# Patient Record
Sex: MALE | Race: WHITE | Employment: OTHER | URBAN - METROPOLITAN AREA
[De-identification: names, ages, dates, MRNs, and addresses within clinical notes are randomized per-mention and may not be internally consistent; named-entity substitution may affect disease eponyms.]

---

## 2019-10-29 ENCOUNTER — APPOINTMENT (OUTPATIENT)
Dept: VASCULAR SURGERY | Age: 84
DRG: 189 | End: 2019-10-29
Attending: INTERNAL MEDICINE
Payer: MEDICARE

## 2019-10-29 ENCOUNTER — HOSPITAL ENCOUNTER (INPATIENT)
Age: 84
LOS: 4 days | Discharge: NURSING, REHAB OR LONG-TERM CARE | DRG: 189 | End: 2019-11-02
Attending: EMERGENCY MEDICINE | Admitting: INTERNAL MEDICINE
Payer: MEDICARE

## 2019-10-29 ENCOUNTER — APPOINTMENT (OUTPATIENT)
Dept: GENERAL RADIOLOGY | Age: 84
DRG: 189 | End: 2019-10-29
Attending: EMERGENCY MEDICINE
Payer: MEDICARE

## 2019-10-29 ENCOUNTER — APPOINTMENT (OUTPATIENT)
Dept: NON INVASIVE DIAGNOSTICS | Age: 84
DRG: 189 | End: 2019-10-29
Attending: INTERNAL MEDICINE
Payer: MEDICARE

## 2019-10-29 DIAGNOSIS — J96.01 ACUTE RESPIRATORY FAILURE WITH HYPOXIA (HCC): Primary | ICD-10-CM

## 2019-10-29 PROBLEM — J96.02 ACUTE RESPIRATORY FAILURE WITH HYPOXIA AND HYPERCAPNIA (HCC): Status: ACTIVE | Noted: 2019-10-29

## 2019-10-29 PROBLEM — N18.9 CHRONIC KIDNEY DISEASE: Status: ACTIVE | Noted: 2019-10-29

## 2019-10-29 PROBLEM — I48.91 ATRIAL FIBRILLATION (HCC): Status: ACTIVE | Noted: 2019-10-29

## 2019-10-29 PROBLEM — F32.9 MAJOR DEPRESSIVE DISORDER: Status: ACTIVE | Noted: 2019-10-29

## 2019-10-29 PROBLEM — I10 HYPERTENSION: Status: ACTIVE | Noted: 2019-10-29

## 2019-10-29 PROBLEM — Z95.0 PACEMAKER: Status: ACTIVE | Noted: 2019-10-29

## 2019-10-29 PROBLEM — I63.9 CEREBRAL INFARCTION (HCC): Status: ACTIVE | Noted: 2019-10-29

## 2019-10-29 PROBLEM — G81.90 HEMIPLEGIA (HCC): Status: ACTIVE | Noted: 2019-10-29

## 2019-10-29 PROBLEM — I25.10 CAD (CORONARY ARTERY DISEASE): Status: ACTIVE | Noted: 2019-10-29

## 2019-10-29 LAB
ALBUMIN SERPL-MCNC: 3.6 G/DL (ref 3.5–5)
ALBUMIN/GLOB SERPL: 0.9 {RATIO} (ref 1.1–2.2)
ALP SERPL-CCNC: 91 U/L (ref 45–117)
ALT SERPL-CCNC: 15 U/L (ref 12–78)
ANION GAP SERPL CALC-SCNC: 6 MMOL/L (ref 5–15)
APPEARANCE UR: CLEAR
ARTERIAL PATENCY WRIST A: YES
ARTERIAL PATENCY WRIST A: YES
AST SERPL-CCNC: 20 U/L (ref 15–37)
ATRIAL RATE: 63 BPM
AV PEAK GRADIENT: 32.67 MMHG
AV VELOCITY RATIO: 0.43
B PERT DNA SPEC QL NAA+PROBE: NOT DETECTED
BACTERIA URNS QL MICRO: NEGATIVE /HPF
BASE DEFICIT BLDA-SCNC: 4.3 MMOL/L
BASE EXCESS BLDA CALC-SCNC: 1 MMOL/L
BASOPHILS # BLD: 0.1 K/UL (ref 0–0.1)
BASOPHILS NFR BLD: 1 % (ref 0–1)
BDY SITE: ABNORMAL
BDY SITE: ABNORMAL
BILIRUB SERPL-MCNC: 0.4 MG/DL (ref 0.2–1)
BILIRUB UR QL: NEGATIVE
BNP SERPL-MCNC: 3677 PG/ML
BUN SERPL-MCNC: 39 MG/DL (ref 6–20)
BUN/CREAT SERPL: 22 (ref 12–20)
C PNEUM DNA SPEC QL NAA+PROBE: NOT DETECTED
CALCIUM SERPL-MCNC: 9.1 MG/DL (ref 8.5–10.1)
CALCULATED R AXIS, ECG10: -27 DEGREES
CALCULATED T AXIS, ECG11: 32 DEGREES
CHLORIDE SERPL-SCNC: 110 MMOL/L (ref 97–108)
CO2 SERPL-SCNC: 26 MMOL/L (ref 21–32)
COLOR UR: ABNORMAL
COMMENT, HOLDF: NORMAL
CREAT SERPL-MCNC: 1.75 MG/DL (ref 0.7–1.3)
D DIMER PPP FEU-MCNC: 1.61 MG/L FEU (ref 0–0.65)
DIAGNOSIS, 93000: NORMAL
DIFFERENTIAL METHOD BLD: ABNORMAL
ECHO AO ROOT DIAM: 3.41 CM
ECHO AV AREA PEAK VELOCITY: 1.2 CM2
ECHO AV PEAK GRADIENT: 12.7 MMHG
ECHO AV PEAK VELOCITY: 177.88 CM/S
ECHO AV REGURGITANT PHT: 524.3 CM
ECHO EST RA PRESSURE: 8 MMHG
ECHO LA MAJOR AXIS: 5.42 CM
ECHO LA TO AORTIC ROOT RATIO: 1.59
ECHO LA VOL 2C: 42.5 ML (ref 18–58)
ECHO LA VOL 4C: 47.02 ML (ref 18–58)
ECHO LA VOL BP: 57.27 ML (ref 18–58)
ECHO LA VOL/BSA BIPLANE: 29.66 ML/M2 (ref 16–28)
ECHO LA VOLUME INDEX A2C: 22.01 ML/M2 (ref 16–28)
ECHO LA VOLUME INDEX A4C: 24.35 ML/M2 (ref 16–28)
ECHO LV INTERNAL DIMENSION DIASTOLIC: 5.13 CM (ref 4.2–5.9)
ECHO LV INTERNAL DIMENSION SYSTOLIC: 3.71 CM
ECHO LV IVSD: 1.05 CM (ref 0.6–1)
ECHO LV MASS 2D: 238.2 G (ref 88–224)
ECHO LV MASS INDEX 2D: 123.3 G/M2 (ref 49–115)
ECHO LV POSTERIOR WALL DIASTOLIC: 1.05 CM (ref 0.6–1)
ECHO LVOT DIAM: 1.93 CM
ECHO LVOT PEAK GRADIENT: 2.3 MMHG
ECHO LVOT PEAK VELOCITY: 75.62 CM/S
ECHO MV A VELOCITY: 56.56 CM/S
ECHO MV E DECELERATION TIME (DT): 313.6 MS
ECHO MV E VELOCITY: 58.74 CM/S
ECHO MV E/A RATIO: 1.04
ECHO MV REGURGITANT PEAK GRADIENT: 49.8 MMHG
ECHO MV REGURGITANT PEAK VELOCITY: 353.02 CM/S
ECHO PULMONARY ARTERY SYSTOLIC PRESSURE (PASP): 35.4 MMHG
ECHO PV MAX VELOCITY: 66.01 CM/S
ECHO PV PEAK GRADIENT: 1.7 MMHG
ECHO RIGHT VENTRICULAR SYSTOLIC PRESSURE (RVSP): 35.4 MMHG
ECHO RV INTERNAL DIMENSION: 3.56 CM
ECHO TV REGURGITANT MAX VELOCITY: 261.95 CM/S
ECHO TV REGURGITANT PEAK GRADIENT: 27.4 MMHG
EOSINOPHIL # BLD: 0.3 K/UL (ref 0–0.4)
EOSINOPHIL NFR BLD: 5 % (ref 0–7)
EPAP/CPAP/PEEP, PAPEEP: 6
EPITH CASTS URNS QL MICRO: ABNORMAL /LPF
ERYTHROCYTE [DISTWIDTH] IN BLOOD BY AUTOMATED COUNT: 13.2 % (ref 11.5–14.5)
FIO2 ON VENT: 40 %
FLUAV H1 2009 PAND RNA SPEC QL NAA+PROBE: NOT DETECTED
FLUAV H1 RNA SPEC QL NAA+PROBE: NOT DETECTED
FLUAV H3 RNA SPEC QL NAA+PROBE: NOT DETECTED
FLUAV SUBTYP SPEC NAA+PROBE: NOT DETECTED
FLUBV RNA SPEC QL NAA+PROBE: NOT DETECTED
GAS FLOW.O2 O2 DELIVERY SYS: 3 L/MIN
GLOBULIN SER CALC-MCNC: 3.8 G/DL (ref 2–4)
GLUCOSE SERPL-MCNC: 159 MG/DL (ref 65–100)
GLUCOSE UR STRIP.AUTO-MCNC: NEGATIVE MG/DL
HADV DNA SPEC QL NAA+PROBE: NOT DETECTED
HCO3 BLDA-SCNC: 23 MMOL/L (ref 22–26)
HCO3 BLDA-SCNC: 27 MMOL/L (ref 22–26)
HCOV 229E RNA SPEC QL NAA+PROBE: NOT DETECTED
HCOV HKU1 RNA SPEC QL NAA+PROBE: NOT DETECTED
HCOV NL63 RNA SPEC QL NAA+PROBE: NOT DETECTED
HCOV OC43 RNA SPEC QL NAA+PROBE: NOT DETECTED
HCT VFR BLD AUTO: 35.9 % (ref 36.6–50.3)
HGB BLD-MCNC: 11.4 G/DL (ref 12.1–17)
HGB UR QL STRIP: ABNORMAL
HMPV RNA SPEC QL NAA+PROBE: NOT DETECTED
HPIV1 RNA SPEC QL NAA+PROBE: NOT DETECTED
HPIV2 RNA SPEC QL NAA+PROBE: NOT DETECTED
HPIV3 RNA SPEC QL NAA+PROBE: NOT DETECTED
HPIV4 RNA SPEC QL NAA+PROBE: NOT DETECTED
HYALINE CASTS URNS QL MICRO: ABNORMAL /LPF (ref 0–5)
IMM GRANULOCYTES # BLD AUTO: 0 K/UL (ref 0–0.04)
IMM GRANULOCYTES NFR BLD AUTO: 0 % (ref 0–0.5)
IPAP/PIP, IPAPIP: 12
KETONES UR QL STRIP.AUTO: NEGATIVE MG/DL
LACTATE BLD-SCNC: 1 MMOL/L (ref 0.4–2)
LEUKOCYTE ESTERASE UR QL STRIP.AUTO: NEGATIVE
LVFS 2D: 27.53 %
LYMPHOCYTES # BLD: 0.8 K/UL (ref 0.8–3.5)
LYMPHOCYTES NFR BLD: 13 % (ref 12–49)
M PNEUMO DNA SPEC QL NAA+PROBE: NOT DETECTED
MAGNESIUM SERPL-MCNC: 2.3 MG/DL (ref 1.6–2.4)
MCH RBC QN AUTO: 32.8 PG (ref 26–34)
MCHC RBC AUTO-ENTMCNC: 31.8 G/DL (ref 30–36.5)
MCV RBC AUTO: 103.2 FL (ref 80–99)
MONOCYTES # BLD: 0.3 K/UL (ref 0–1)
MONOCYTES NFR BLD: 5 % (ref 5–13)
MV DEC SLOPE: 1.87
NEUTS SEG # BLD: 4.6 K/UL (ref 1.8–8)
NEUTS SEG NFR BLD: 76 % (ref 32–75)
NITRITE UR QL STRIP.AUTO: NEGATIVE
NRBC # BLD: 0 K/UL (ref 0–0.01)
NRBC BLD-RTO: 0 PER 100 WBC
P-R INTERVAL, ECG05: 298 MS
PCO2 BLDA: 45 MMHG (ref 35–45)
PCO2 BLDA: 51 MMHG (ref 35–45)
PH BLDA: 7.27 [PH] (ref 7.35–7.45)
PH BLDA: 7.39 [PH] (ref 7.35–7.45)
PH UR STRIP: 5 [PH] (ref 5–8)
PISA AR MAX VEL: 285.78 CM/S
PLATELET # BLD AUTO: 165 K/UL (ref 150–400)
PMV BLD AUTO: 11.5 FL (ref 8.9–12.9)
PO2 BLDA: 69 MMHG (ref 80–100)
PO2 BLDA: 87 MMHG (ref 80–100)
POTASSIUM SERPL-SCNC: 4.7 MMOL/L (ref 3.5–5.1)
PROCALCITONIN SERPL-MCNC: <0.1 NG/ML
PROT SERPL-MCNC: 7.4 G/DL (ref 6.4–8.2)
PROT UR STRIP-MCNC: 30 MG/DL
PULMONARY ARTERY END DIASTOLIC PRESSURE: 10.4 MMHG
PULMONARY ARTERY MEAN PRESURE: 18.7 MMHG
PV END DIASTOLIC VELOCITY: 0.8 MMHG
Q-T INTERVAL, ECG07: 482 MS
QRS DURATION, ECG06: 78 MS
QTC CALCULATION (BEZET), ECG08: 493 MS
RBC # BLD AUTO: 3.48 M/UL (ref 4.1–5.7)
RBC #/AREA URNS HPF: ABNORMAL /HPF (ref 0–5)
RBC MORPH BLD: ABNORMAL
RSV RNA SPEC QL NAA+PROBE: NOT DETECTED
RV+EV RNA SPEC QL NAA+PROBE: NOT DETECTED
SAMPLES BEING HELD,HOLD: NORMAL
SAO2 % BLD: 94 % (ref 92–97)
SAO2 % BLD: 95 % (ref 92–97)
SAO2% DEVICE SAO2% SENSOR NAME: ABNORMAL
SAO2% DEVICE SAO2% SENSOR NAME: ABNORMAL
SODIUM SERPL-SCNC: 142 MMOL/L (ref 136–145)
SP GR UR REFRACTOMETRY: 1.01 (ref 1–1.03)
SPECIMEN SITE: ABNORMAL
SPECIMEN SITE: ABNORMAL
TROPONIN I SERPL-MCNC: <0.05 NG/ML
UR CULT HOLD, URHOLD: NORMAL
UROBILINOGEN UR QL STRIP.AUTO: 0.2 EU/DL (ref 0.2–1)
VENTILATION MODE VENT: ABNORMAL
VENTRICULAR RATE, ECG03: 63 BPM
WBC # BLD AUTO: 6.1 K/UL (ref 4.1–11.1)
WBC URNS QL MICRO: ABNORMAL /HPF (ref 0–4)

## 2019-10-29 PROCEDURE — 74011000250 HC RX REV CODE- 250: Performed by: EMERGENCY MEDICINE

## 2019-10-29 PROCEDURE — 94640 AIRWAY INHALATION TREATMENT: CPT

## 2019-10-29 PROCEDURE — 81001 URINALYSIS AUTO W/SCOPE: CPT

## 2019-10-29 PROCEDURE — 94660 CPAP INITIATION&MGMT: CPT

## 2019-10-29 PROCEDURE — 93970 EXTREMITY STUDY: CPT

## 2019-10-29 PROCEDURE — 93306 TTE W/DOPPLER COMPLETE: CPT

## 2019-10-29 PROCEDURE — 36600 WITHDRAWAL OF ARTERIAL BLOOD: CPT

## 2019-10-29 PROCEDURE — 0099U RESPIRATORY PANEL,PCR,NASOPHARYNGEAL: CPT

## 2019-10-29 PROCEDURE — 83880 ASSAY OF NATRIURETIC PEPTIDE: CPT

## 2019-10-29 PROCEDURE — 83735 ASSAY OF MAGNESIUM: CPT

## 2019-10-29 PROCEDURE — 80053 COMPREHEN METABOLIC PANEL: CPT

## 2019-10-29 PROCEDURE — 36415 COLL VENOUS BLD VENIPUNCTURE: CPT

## 2019-10-29 PROCEDURE — 85379 FIBRIN DEGRADATION QUANT: CPT

## 2019-10-29 PROCEDURE — 82803 BLOOD GASES ANY COMBINATION: CPT

## 2019-10-29 PROCEDURE — 65660000000 HC RM CCU STEPDOWN

## 2019-10-29 PROCEDURE — 77030011943

## 2019-10-29 PROCEDURE — 74011250636 HC RX REV CODE- 250/636: Performed by: INTERNAL MEDICINE

## 2019-10-29 PROCEDURE — 83605 ASSAY OF LACTIC ACID: CPT

## 2019-10-29 PROCEDURE — 93005 ELECTROCARDIOGRAM TRACING: CPT

## 2019-10-29 PROCEDURE — 84484 ASSAY OF TROPONIN QUANT: CPT

## 2019-10-29 PROCEDURE — 84145 PROCALCITONIN (PCT): CPT

## 2019-10-29 PROCEDURE — 74011250636 HC RX REV CODE- 250/636: Performed by: EMERGENCY MEDICINE

## 2019-10-29 PROCEDURE — 94664 DEMO&/EVAL PT USE INHALER: CPT

## 2019-10-29 PROCEDURE — 96374 THER/PROPH/DIAG INJ IV PUSH: CPT

## 2019-10-29 PROCEDURE — 74011000258 HC RX REV CODE- 258: Performed by: INTERNAL MEDICINE

## 2019-10-29 PROCEDURE — 74011250637 HC RX REV CODE- 250/637: Performed by: INTERNAL MEDICINE

## 2019-10-29 PROCEDURE — 99285 EMERGENCY DEPT VISIT HI MDM: CPT

## 2019-10-29 PROCEDURE — 87040 BLOOD CULTURE FOR BACTERIA: CPT

## 2019-10-29 PROCEDURE — 5A09357 ASSISTANCE WITH RESPIRATORY VENTILATION, LESS THAN 24 CONSECUTIVE HOURS, CONTINUOUS POSITIVE AIRWAY PRESSURE: ICD-10-PCS | Performed by: EMERGENCY MEDICINE

## 2019-10-29 PROCEDURE — 71045 X-RAY EXAM CHEST 1 VIEW: CPT

## 2019-10-29 PROCEDURE — 85025 COMPLETE CBC W/AUTO DIFF WBC: CPT

## 2019-10-29 PROCEDURE — 74011000250 HC RX REV CODE- 250: Performed by: INTERNAL MEDICINE

## 2019-10-29 PROCEDURE — 77030012879 HC MSK CPAP FLL FAC PHIL -B

## 2019-10-29 RX ORDER — FUROSEMIDE 10 MG/ML
40 INJECTION INTRAMUSCULAR; INTRAVENOUS 2 TIMES DAILY
Status: DISCONTINUED | OUTPATIENT
Start: 2019-10-29 | End: 2019-10-30

## 2019-10-29 RX ORDER — MELATONIN
1000 DAILY
Status: DISCONTINUED | OUTPATIENT
Start: 2019-10-29 | End: 2019-11-02 | Stop reason: HOSPADM

## 2019-10-29 RX ORDER — CARVEDILOL 3.12 MG/1
3.12 TABLET ORAL 2 TIMES DAILY
COMMUNITY

## 2019-10-29 RX ORDER — MIRTAZAPINE 15 MG/1
15 TABLET, FILM COATED ORAL
Status: DISCONTINUED | OUTPATIENT
Start: 2019-10-29 | End: 2019-11-02 | Stop reason: HOSPADM

## 2019-10-29 RX ORDER — BRINZOLAMIDE 10 MG/ML
1 SUSPENSION/ DROPS OPHTHALMIC 3 TIMES DAILY
COMMUNITY

## 2019-10-29 RX ORDER — THERA TABS 400 MCG
1 TAB ORAL DAILY
Status: DISCONTINUED | OUTPATIENT
Start: 2019-10-29 | End: 2019-11-02 | Stop reason: HOSPADM

## 2019-10-29 RX ORDER — FUROSEMIDE 10 MG/ML
40 INJECTION INTRAMUSCULAR; INTRAVENOUS
Status: COMPLETED | OUTPATIENT
Start: 2019-10-29 | End: 2019-10-29

## 2019-10-29 RX ORDER — ACETAMINOPHEN 325 MG/1
650 TABLET ORAL
COMMUNITY

## 2019-10-29 RX ORDER — ALBUTEROL SULFATE 0.83 MG/ML
2.5 SOLUTION RESPIRATORY (INHALATION)
COMMUNITY

## 2019-10-29 RX ORDER — SIMVASTATIN 20 MG/1
20 TABLET, FILM COATED ORAL
COMMUNITY

## 2019-10-29 RX ORDER — IPRATROPIUM BROMIDE AND ALBUTEROL SULFATE 2.5; .5 MG/3ML; MG/3ML
3 SOLUTION RESPIRATORY (INHALATION)
COMMUNITY
End: 2019-10-29

## 2019-10-29 RX ORDER — SODIUM CHLORIDE 0.9 % (FLUSH) 0.9 %
5-40 SYRINGE (ML) INJECTION AS NEEDED
Status: DISCONTINUED | OUTPATIENT
Start: 2019-10-29 | End: 2019-11-02 | Stop reason: HOSPADM

## 2019-10-29 RX ORDER — ACETAMINOPHEN 325 MG/1
650 TABLET ORAL
Status: DISCONTINUED | OUTPATIENT
Start: 2019-10-29 | End: 2019-11-02 | Stop reason: HOSPADM

## 2019-10-29 RX ORDER — MIRTAZAPINE 15 MG/1
15 TABLET, FILM COATED ORAL
COMMUNITY

## 2019-10-29 RX ORDER — NALOXONE HYDROCHLORIDE 0.4 MG/ML
0.4 INJECTION, SOLUTION INTRAMUSCULAR; INTRAVENOUS; SUBCUTANEOUS AS NEEDED
Status: DISCONTINUED | OUTPATIENT
Start: 2019-10-29 | End: 2019-11-02 | Stop reason: HOSPADM

## 2019-10-29 RX ORDER — MELATONIN
1000 DAILY
COMMUNITY

## 2019-10-29 RX ORDER — SODIUM CHLORIDE 0.9 % (FLUSH) 0.9 %
5-40 SYRINGE (ML) INJECTION EVERY 8 HOURS
Status: DISCONTINUED | OUTPATIENT
Start: 2019-10-29 | End: 2019-11-02 | Stop reason: HOSPADM

## 2019-10-29 RX ORDER — LORATADINE 10 MG/1
10 TABLET ORAL DAILY
COMMUNITY

## 2019-10-29 RX ORDER — LORATADINE 10 MG/1
10 TABLET ORAL DAILY
Status: DISCONTINUED | OUTPATIENT
Start: 2019-10-29 | End: 2019-11-02 | Stop reason: HOSPADM

## 2019-10-29 RX ORDER — COLCHICINE 0.6 MG/1
0.6 TABLET ORAL
Status: DISCONTINUED | OUTPATIENT
Start: 2019-10-29 | End: 2019-11-02 | Stop reason: HOSPADM

## 2019-10-29 RX ORDER — CARBOXYMETHYLCELLULOSE SODIUM 10 MG/ML
2 GEL OPHTHALMIC
Status: DISCONTINUED | OUTPATIENT
Start: 2019-10-29 | End: 2019-11-02 | Stop reason: HOSPADM

## 2019-10-29 RX ORDER — LANOLIN ALCOHOL/MO/W.PET/CERES
2 CREAM (GRAM) TOPICAL 2 TIMES DAILY
COMMUNITY

## 2019-10-29 RX ORDER — SIMVASTATIN 20 MG/1
20 TABLET, FILM COATED ORAL
Status: DISCONTINUED | OUTPATIENT
Start: 2019-10-29 | End: 2019-11-02 | Stop reason: HOSPADM

## 2019-10-29 RX ORDER — ONDANSETRON 4 MG/1
4 TABLET, FILM COATED ORAL
COMMUNITY
End: 2019-10-29

## 2019-10-29 RX ORDER — BRINZOLAMIDE 10 MG/ML
1 SUSPENSION/ DROPS OPHTHALMIC 3 TIMES DAILY
Status: DISCONTINUED | OUTPATIENT
Start: 2019-10-29 | End: 2019-10-29 | Stop reason: CLARIF

## 2019-10-29 RX ORDER — DORZOLAMIDE HCL 20 MG/ML
1 SOLUTION/ DROPS OPHTHALMIC 3 TIMES DAILY
Status: DISCONTINUED | OUTPATIENT
Start: 2019-10-29 | End: 2019-11-02 | Stop reason: HOSPADM

## 2019-10-29 RX ORDER — LOSARTAN POTASSIUM 25 MG/1
25 TABLET ORAL DAILY
COMMUNITY
End: 2019-11-02

## 2019-10-29 RX ORDER — THERA TABS 400 MCG
1 TAB ORAL DAILY
COMMUNITY

## 2019-10-29 RX ORDER — LEVOFLOXACIN 5 MG/ML
750 INJECTION, SOLUTION INTRAVENOUS
Status: COMPLETED | OUTPATIENT
Start: 2019-10-29 | End: 2019-10-29

## 2019-10-29 RX ORDER — TIMOLOL MALEATE 5 MG/ML
1 SOLUTION OPHTHALMIC DAILY
COMMUNITY
End: 2019-10-29

## 2019-10-29 RX ORDER — IPRATROPIUM BROMIDE AND ALBUTEROL SULFATE 2.5; .5 MG/3ML; MG/3ML
3 SOLUTION RESPIRATORY (INHALATION)
Status: DISCONTINUED | OUTPATIENT
Start: 2019-10-29 | End: 2019-11-02 | Stop reason: HOSPADM

## 2019-10-29 RX ORDER — COLCHICINE 0.6 MG/1
0.6 TABLET ORAL
COMMUNITY

## 2019-10-29 RX ORDER — CARBOXYMETHYLCELLULOSE SODIUM 5 MG/ML
2 SOLUTION/ DROPS OPHTHALMIC 3 TIMES DAILY
COMMUNITY

## 2019-10-29 RX ORDER — CARVEDILOL 3.12 MG/1
3.12 TABLET ORAL 2 TIMES DAILY WITH MEALS
Status: DISCONTINUED | OUTPATIENT
Start: 2019-10-30 | End: 2019-11-02 | Stop reason: HOSPADM

## 2019-10-29 RX ORDER — SERTRALINE HYDROCHLORIDE 50 MG/1
50 TABLET, FILM COATED ORAL DAILY
Status: DISCONTINUED | OUTPATIENT
Start: 2019-10-29 | End: 2019-11-02 | Stop reason: HOSPADM

## 2019-10-29 RX ORDER — SERTRALINE HYDROCHLORIDE 50 MG/1
50 TABLET, FILM COATED ORAL DAILY
COMMUNITY

## 2019-10-29 RX ADMIN — Medication 10 ML: at 21:11

## 2019-10-29 RX ADMIN — APIXABAN 2.5 MG: 2.5 TABLET, FILM COATED ORAL at 21:10

## 2019-10-29 RX ADMIN — PIPERACILLIN AND TAZOBACTAM 3.38 G: 3; .375 INJECTION, POWDER, LYOPHILIZED, FOR SOLUTION INTRAVENOUS at 16:59

## 2019-10-29 RX ADMIN — Medication 10 ML: at 18:44

## 2019-10-29 RX ADMIN — SIMVASTATIN 20 MG: 20 TABLET, FILM COATED ORAL at 21:10

## 2019-10-29 RX ADMIN — FUROSEMIDE 40 MG: 10 INJECTION, SOLUTION INTRAMUSCULAR; INTRAVENOUS at 08:59

## 2019-10-29 RX ADMIN — LEVOFLOXACIN 750 MG: 5 INJECTION, SOLUTION INTRAVENOUS at 09:01

## 2019-10-29 RX ADMIN — MIRTAZAPINE 15 MG: 15 TABLET, FILM COATED ORAL at 21:10

## 2019-10-29 RX ADMIN — ALBUTEROL SULFATE 1 DOSE: 2.5 SOLUTION RESPIRATORY (INHALATION) at 07:50

## 2019-10-29 RX ADMIN — THERA TABS 1 TABLET: TAB at 21:10

## 2019-10-29 RX ADMIN — PIPERACILLIN AND TAZOBACTAM 3.38 G: 3; .375 INJECTION, POWDER, LYOPHILIZED, FOR SOLUTION INTRAVENOUS at 10:13

## 2019-10-29 RX ADMIN — Medication 10 ML: at 09:04

## 2019-10-29 RX ADMIN — LORATADINE 10 MG: 10 TABLET ORAL at 21:10

## 2019-10-29 RX ADMIN — ALBUTEROL SULFATE 1 DOSE: 2.5 SOLUTION RESPIRATORY (INHALATION) at 08:00

## 2019-10-29 RX ADMIN — FUROSEMIDE 40 MG: 10 INJECTION, SOLUTION INTRAMUSCULAR; INTRAVENOUS at 18:44

## 2019-10-29 RX ADMIN — ALBUTEROL SULFATE 1 DOSE: 2.5 SOLUTION RESPIRATORY (INHALATION) at 08:45

## 2019-10-29 RX ADMIN — Medication 10 ML: at 21:12

## 2019-10-29 RX ADMIN — Medication 10 ML: at 10:22

## 2019-10-29 RX ADMIN — VITAMIN D, TAB 1000IU (100/BT) 1 TABLET: 25 TAB at 21:10

## 2019-10-29 RX ADMIN — SERTRALINE HYDROCHLORIDE 50 MG: 50 TABLET ORAL at 21:10

## 2019-10-29 RX ADMIN — CEFEPIME HYDROCHLORIDE 2 G: 2 INJECTION, POWDER, FOR SOLUTION INTRAVENOUS at 08:03

## 2019-10-29 RX ADMIN — MIRABEGRON 50 MG: 25 TABLET, FILM COATED, EXTENDED RELEASE ORAL at 21:10

## 2019-10-29 RX ADMIN — DORZOLAMIDE HYDROCHLORIDE 1 DROP: 20 SOLUTION/ DROPS OPHTHALMIC at 21:10

## 2019-10-29 NOTE — PROGRESS NOTES
TRANSFER - IN REPORT:    Verbal report received from [facility]) on [facility]  being received from ED(unit) for routine progression of care      Report consisted of patients Situation, Background, Assessment and   Recommendations(SBAR). Information from the following report(s) SBAR, Kardex and Cardiac Rhythm NSR was reviewed with the receiving nurse. Opportunity for questions and clarification was provided. Assessment completed upon patients arrival to unit and care assumed. 1830 patient placed on contact and droplet precautions, RSV panel sent. 1900 mobility team and wound care consult ordered for patient. Barely blanching redness on both heels and sacrum. Heels offloaded with heel boots. 1945 patient taken off of Bipap at [address] by respiratory therapist Carmen. Called to let Dr. Jaqui Garzon know, Dr. Jaqui Garzon would like a repeat ABG done to assure that CO2 levels are not rising while patient is off of bipap. Patient currently in 3L NC, no respiratory distress. Bedside, Verbal and Written shift change report given to Rena Riddle (oncoming nurse) by Kendell Evans (offgoing nurse). Report included the following information SBAR, Kardex and Cardiac Rhythm NSR.

## 2019-10-29 NOTE — CONSULTS
Full note to follow. 81 yo WM w/ hx of CVA, AF s/p PPM, CAD, CKD3 presenting with dyspnea x 1 week, found to have acute hypoxic/hypercapneic resp failure. On exam, scattered rales, BLE pedal edema. Comfortable on BiPAP. CXR showed evidence of congestive change. Presence of bilateral pleural effusions. Superimposed parenchymal disease at the lung bases may be present. Pro-BNP elevated. ABG showed resp acidosis. Continue IV diuretics. Strict I/Os. TTE. Check LE dopplers. Poor renal function precludes CTA chest.  Continue IV abx (change to Zosyn/doxy). Pt does have dysphagia so should have anaerobic coverage. TTE. Continue BiPAP for now as pt appears much more comfortable on NPPV.   Pulmonology consult

## 2019-10-29 NOTE — CONSULTS
Full note to follow    Impression:  Acute hypoxic resp failure  Acute pulm edema  Conduction disease - a-paced, v-sensed  PAF without recurrence  Very elderly  DNR    Recommendation:   Iv loops  Echocardiogram      Discussed with patient's daughter    Aime Meza MD

## 2019-10-29 NOTE — H&P
[address] 19  [phone]    Hospitalist Admission Note      NAME:  Zandra Martinez   :   1923   MRN:  [id number]     PCP:  Victor Hugo Galindo MD     Date/Time:  10/29/2019 6:55 PM         Assessment / Plan:           Acute respiratory failure with hypoxia and hypercapnia:severe on admission, requiring NPPV. Likely due to acute diastolic CHF, with possible PNA. Wean off BiPAP as tolerated. Start IV diuretics, strict I/Os, daily weight. TTE. Empiric IV abx (Zosyn/doxy). Send sputum culture, PNA workup (Legionella and Strep Pneumo urine ag, Mycoplasma IgM). Send viral resp panel. Appreciate cardiology and pulmonology input      Atrial fibrillation: Pacemaker in place. Continue Coreg, Eliquis. CAD (coronary artery disease): no CP. Troponin negative. On Eliquis, statin, BB. Hypertension: BP controlled. Monitor on diuretics and Coreg. ARB on hold for now       Chronic kidney disease: presumed stage 3. Unclear baseline. Follow on diuretics. ARB on hold      History of cerebral infarction: on Elqiuis, statin      Major depressive disorder: cont sertraline    Code Status: DNR (DDNR in place)     Surrogate decision maker: daughter      ED notes and lab results reviewed. Total time spent with patient: 70 Minutes, including 30 minutes CC  Time spent in the care of this patient included reviewing records, discussing with nursing, obtaining history and examining the patient, and discussing treatment plans, with >50% time spent counseling/coordinating care  Critical Care:  I personally spent 30 minutes in providing critical care.  The reason for providing this level of medical care for this critically ill patient was due to a critical illness (acute respiratory failure requiring NPPV) that impaired one or more vital organ systems such that there was a high probability of imminent or life threatening deterioration in the patient's condition. This care involved high complexity decision making to assess, manipulate, and support vital system functions. Risk of deterioration: High                 Care Plan discussed with: ED provider, Patient, Family, Nursing Staff, Consultant/Specialist and >50% of time spent in counseling and coordination of care    Discussed:  Code Status, Care Plan and D/C Planning    Prophylaxis:  Eliquis    Disposition:  Home w/Family                 Subjective:     CHIEF COMPLAINT: dyspnea     HISTORY OF PRESENT ILLNESS:     Mr. Wilbur Palacios is a 80 y.o. male w/ hx of MMP as below who presents with dyspnea. Started one week ago, intermittent, moderate to severe, associated with occasional coughing with white sputum. No fevers or chills. No CP. ED workup showed CXR with pulmonary edema, pleural effusion, possible airspace disease    Mr. Wilbur Palacios is admitted for further evaluation and management. Past Medical History:   Diagnosis Date    Allergic rhinitis     Arthritis     Atrial fibrillation (HCC)     Benign prostatic hyperplasia     CAD (coronary artery disease)     Cerebral infarction (HCC)     Chronic kidney disease     Chronic pain     Dry eye syndrome     Dysphagia     Glaucoma     Gout     Hearing loss, bilateral     Hemiplegia (HCC)     left side    Hyperlipemia     Hypertension     Major depressive disorder     Osteoarthritis     Overactive bladder     Pacemaker     Polyarthritis     Urinary incontinence     Vitamin D deficiency         History reviewed. No pertinent surgical history. Social History     Tobacco Use    Smoking status: Not on file   Substance Use Topics    Alcohol use: Not on file        Family History: family history of CV disease    No Known Allergies     Prior to Admission medications    Medication Sig Start Date End Date Taking? Authorizing Provider   apixaban (ELIQUIS) 2.5 mg tablet Take 2.5 mg by mouth two (2) times a day.    Yes Other, MD Cal cholecalciferol (VITAMIN D3) (1000 Units /25 mcg) tablet Take 1,000 Units by mouth daily. Yes Faisal, MD Cal   loratadine (CLARITIN) 10 mg tablet Take 10 mg by mouth daily. Yes Cal Malone MD   acetaminophen (TYLENOL) 325 mg tablet Take 650 mg by mouth every four (4) hours as needed for Pain. Yes Faisal, MD Cal   glucosamine-chondroitin ([address] [place] [address]) 500-400 mg cap Take 2 Caps by mouth two (2) times a day. Yes Faisal, MD Cal   brinzolamide (AZOPT) 1 % ophthalmic suspension Administer 1 Drop to both eyes three (3) times daily. Yes Cal Malone MD   losartan (COZAAR) 25 mg tablet Take 25 mg by mouth daily. Yes Cal Malone MD   carvedilol (COREG) 3.125 mg tablet Take 3.125 mg by mouth two (2) times a day. Yes Cal Malone MD   mirabegron ER (MYRBETRIQ) 50 mg ER tablet Take 50 mg by mouth daily. Yes Cal Malone MD   simvastatin (ZOCOR) 20 mg tablet Take 20 mg by mouth nightly. Yes Faisal, MD Cal   carboxymethylcellulose sodium (REFRESH TEARS) 0.5 % drop ophthalmic solution Administer 2 Drops to both eyes three (3) times daily. Yes Cal Malone MD   colchicine 0.6 mg tablet Take 0.6 mg by mouth two (2) times daily as needed (gout attack). Yes Cal Malone MD   sertraline (ZOLOFT) 50 mg tablet Take 50 mg by mouth daily. Indications: major depressive disorder   Yes Faisal, MD Cal   mirtazapine (REMERON) 15 mg tablet Take 15 mg by mouth nightly. Yes Faisal, MD Cal   therapeutic multivitamin (THERAGRAN) tablet Take 1 Tab by mouth daily. Yes Provider, Historical   albuterol (PROVENTIL VENTOLIN) 2.5 mg /3 mL (0.083 %) nebu 2.5 mg by Nebulization route every six (6) hours as needed (shortness of breath).    Yes Provider, Historical       Review of Systems:  (bold if positive, if negative)    Gen:  fatigueEyes:  ENT:  CVS:  edemaPulm:  Cough, dyspnea, sputumGI:    :    MS:  Skin:  Psych:  Endo:    Hem:  Renal:    Neuro:            Objective:      VITALS:    Vital signs reviewed; most recent are:    Visit Vitals  /52 (BP 1 Location: Right arm, BP Patient Position: At rest)   Pulse 60   Temp 97.5 °F (36.4 °C)   Resp 22   Ht 5' 8\" (1.727 m)   Wt 79.4 kg (175 lb)   SpO2 95%   BMI 26.61 kg/m²     SpO2 Readings from Last 6 Encounters:   10/29/19 95%    O2 Flow Rate (L/min): 3 l/min       Intake/Output Summary (Last 24 hours) at 10/29/2019 1855  Last data filed at 10/29/2019 1706  Gross per 24 hour   Intake 100 ml   Output 725 ml   Net -625 ml            Exam:     Physical Exam:    Gen: elderly, chronically ill-appearing. NAD  HEENT:  No scleral icterus,  hearing intact to voice, BiPAP mask on  Neck:  Supple, without masses. Resp:  No accessory muscle use. Increased WOB. Bibasilar rales. Without wheezing or rhonchi  Card: RRR. Normal S1 and S2 with 2/6 systolic murmur. No rubs, or gallops. 1+ bilateral pedal edema. +JVD. Peripheral pulses in tact. Abd:  Normoactive bowel sounds. Soft, non-tender, non-distended. No rebound, no guarding. No appreciable hepatosplenomegaly   Lymph:  No cervical adenopathy  Musc:  No cyanosis or clubbing  Skin:  No rashes or ulcers; turgor intact  Neuro:  Cranial nerves are grossly intact, no focal motor weakness, follows commands appropriately  Psych:  Good insight, normal affect. Alert, oriented to self and hospital. Answers questions appropriately       Labs:    Recent Labs     10/29/19  0729   WBC 6.1   HGB 11.4*   HCT 35.9*        Recent Labs     10/29/19  0729      K 4.7   *   CO2 26   *   BUN 39*   CREA 1.75*   CA 9.1   MG 2.3   ALB 3.6   SGOT 20   ALT 15     No components found for: GLPOC  Recent Labs     10/29/19  0744   PH 7.27*   PCO2 51*   PO2 87   HCO3 23   FIO2 40     No results for input(s): INR, INREXT in the last 72 hours. No results found for: SDES  No results found for: CULT  All other current labs reviewed in the computer. Imaging/Studies:    CXR: Evidence of congestive change. Presence of bilateral pleural  effusions. Superimposed parenchymal disease at the lung bases may be present.   Imaging personally reviewed    EKG: paced  EKG personally reviewed    ___________________________________________________    Attending Physician: Apoorva Lou MD

## 2019-10-29 NOTE — CONSULTS
Name: [place]: [address]   : 1923 Admit Date: 10/29/2019   Phone: [phone]  Room: [facility]/   PCP: Jesus Hickman MD  MRN: [id number]   Date: 10/29/2019  Code: DNR          Chart and notes reviewed. Data reviewed. I review the patient's current medications in the medical record at each encounter. I have evaluated and examined the patient. HPI:    12:48 PM       History was obtained from chart review. I was asked by Neto Rehman MD to see Dawnorsana Guaman in consultation for a chief complaint of acute hypoxic and hypercapnic respiratory failure. History of Present Illness:  Mr. Misha Bills is a 81 yo gentleman with a history of prior CVA, afib s/p PPM, CAD, and CKD who is admitted with acute hypoxic and hypercapnic respiratory failure. Patient is unable to provide any history and history was obtained via chart review and from other medical providers. He has been short of breath for about a week. Noted to have pedal edema on exam and CXR with effusion and pulmonary edema. Placed on BiPAP in the ED for increased work of breathing and now appears comfortable. Labs: WBC 6.1, cr 1.75, proBNP 3677, troponin <0.05    Images:  CXR with bilateral effusions and pulmonary edema      Past Medical History:   Diagnosis Date    Allergic rhinitis     Arthritis     Atrial fibrillation (HCC)     Benign prostatic hyperplasia     CAD (coronary artery disease)     Cerebral infarction (HCC)     Chronic kidney disease     Chronic pain     Dry eye syndrome     Dysphagia     Glaucoma     Gout     Hearing loss, bilateral     Hemiplegia (HCC)     left side    Hyperlipemia     Hypertension     Major depressive disorder     Osteoarthritis     Overactive bladder     Pacemaker     Polyarthritis     Urinary incontinence     Vitamin D deficiency        History reviewed. No pertinent surgical history. History reviewed. No pertinent family history.     Social History Tobacco Use    Smoking status: Not on file   Substance Use Topics    Alcohol use: Not on file       No Known Allergies    Current Facility-Administered Medications   Medication Dose Route Frequency    sodium chloride (NS) flush 5-40 mL  5-40 mL IntraVENous Q8H    sodium chloride (NS) flush 5-40 mL  5-40 mL IntraVENous PRN    sodium chloride (NS) flush 5-40 mL  5-40 mL IntraVENous Q8H    sodium chloride (NS) flush 5-40 mL  5-40 mL IntraVENous PRN    acetaminophen (TYLENOL) tablet 650 mg  650 mg Oral Q6H PRN    naloxone (NARCAN) injection 0.4 mg  0.4 mg IntraVENous PRN    furosemide (LASIX) injection 40 mg  40 mg IntraVENous BID    albuterol-ipratropium (DUO-NEB) 2.5 MG-0.5 MG/3 ML  3 mL Nebulization Q6H PRN    piperacillin-tazobactam (ZOSYN) 3.375 g in 0.9% sodium chloride (MBP/ADV) 100 mL  3.375 g IntraVENous Q8H    [START ON 10/30/2019] doxycycline (VIBRAMYCIN) 100 mg in 0.9% sodium chloride (MBP/ADV) 100 mL  100 mg IntraVENous Q12H     Current Outpatient Medications   Medication Sig    apixaban (ELIQUIS) 2.5 mg tablet Take 2.5 mg by mouth two (2) times a day.  cholecalciferol (VITAMIN D3) (1000 Units /25 mcg) tablet Take  by mouth daily.  loratadine (CLARITIN) 10 mg tablet Take 10 mg by mouth daily.  acetaminophen (TYLENOL) 325 mg tablet Take 650 mg by mouth every four (4) hours as needed for Pain.  glucosamine-chondroitin (ARTHX) 500-400 mg cap Take 1 Cap by mouth daily.  brinzolamide (AZOPT) 1 % ophthalmic suspension Administer 1 Drop to both eyes three (3) times daily.  losartan (COZAAR) 25 mg tablet Take 25 mg by mouth daily.  carvedilol (COREG) 3.125 mg tablet Take 3.125 mg by mouth two (2) times a day.  mirabegron ER (MYRBETRIQ) 50 mg ER tablet Take 50 mg by mouth daily.  simvastatin (ZOCOR) 20 mg tablet Take 20 mg by mouth nightly.  carboxymethylcellulose sodium (REFRESH TEARS) 0.5 % drop ophthalmic solution Administer 1 Drop to both eyes three (3) times daily.  colchicine 0.6 mg tablet Take 0.6 mg by mouth two (2) times daily as needed for Pain.  sertraline (ZOLOFT) 50 mg tablet Take 50 mg by mouth daily. Indications: major depressive disorder    mirtazapine (REMERON) 15 mg tablet Take 15 mg by mouth nightly.  albuterol-ipratropium (DUO-NEB) 2.5 mg-0.5 mg/3 ml nebu 3 mL by Nebulization route.  ondansetron hcl (ZOFRAN) 4 mg tablet Take 4 mg by mouth every eight (8) hours as needed for Nausea.  timolol (TIMOPTIC-XE) 0.5 % ophthalmic gel-forming Administer 1 Drop to both eyes daily.  guaiFENesin (MUCINEX) 1,200 mg Ta12 ER tablet Take 1,200 mg by mouth two (2) times a day. REVIEW OF SYSTEMS   12 point ROS negative except as stated in the HPI. Physical Exam:   Visit Vitals  /50   Pulse 60   Temp 96.6 °F (35.9 °C)   Resp 15   Ht 5' 8\" (1.727 m)   Wt 79.4 kg (175 lb)   SpO2 99%   BMI 26.61 kg/m²       General:  Asleep, NAD   Head:  Normocephalic, without obvious abnormality, atraumatic. Eyes:  Conjunctivae/corneas clear. Nose: Nares normal. Septum midline. Mucosa normal.    Throat: Lips, mucosa, and tongue normal.    Neck: Supple, symmetrical, trachea midline, no adenopathy. Lungs:   Clear to auscultation bilaterally. Chest wall:  No tenderness or deformity. Heart:  Regular rate and rhythm, S1, S2 normal, no murmur, click, rub or gallop. Abdomen:   Soft, non-tender. Bowel sounds normal.   Extremities: Extremities normal, atraumatic, no cyanosis; +BLE edema   Pulses: 2+ and symmetric all extremities.    Skin: Skin color, texture, turgor normal. No rashes or lesions   Lymph nodes: Cervical, supraclavicular nodes normal.   Neurologic: Grossly nonfocal       Lab Results   Component Value Date/Time    Sodium 142 10/29/2019 07:29 AM    Potassium 4.7 10/29/2019 07:29 AM    Chloride 110 (H) 10/29/2019 07:29 AM    CO2 26 10/29/2019 07:29 AM    BUN 39 (H) 10/29/2019 07:29 AM    Creatinine 1.75 (H) 10/29/2019 07:29 AM    Glucose 159 (H) 10/29/2019 07:29 AM    Calcium 9.1 10/29/2019 07:29 AM    Magnesium 2.3 10/29/2019 07:29 AM       Lab Results   Component Value Date/Time    WBC 6.1 10/29/2019 07:29 AM    HGB 11.4 (L) 10/29/2019 07:29 AM    PLATELET 012 73/30/2119 07:29 AM    .2 (H) 10/29/2019 07:29 AM       Lab Results   Component Value Date/Time    AST (SGOT) 20 10/29/2019 07:29 AM    Alk.  phosphatase 91 10/29/2019 07:29 AM    Protein, total 7.4 10/29/2019 07:29 AM    Albumin 3.6 10/29/2019 07:29 AM    Globulin 3.8 10/29/2019 07:29 AM       No results found for: IRON, FE, TIBC, IBCT, PSAT, FERR    No results found for: SR, CRP, DARIN, ANAIGG, RA, RPR, RPRT, VDRLT, VDRLS, TSH, TSHEXT     Lab Results   Component Value Date/Time    PH 7.27 (L) 10/29/2019 07:44 AM    PCO2 51 (H) 10/29/2019 07:44 AM    PO2 87 10/29/2019 07:44 AM    HCO3 23 10/29/2019 07:44 AM    FIO2 40 10/29/2019 07:44 AM       Lab Results   Component Value Date/Time    Troponin-I, Qt. <0.05 10/29/2019 07:29 AM        No results found for: CULT    No results found for: TOXA1, RPR, HBCM, HBSAG, HAAB, HCAB1, HAAT, G6PD, CRYAC, HIVGT, HIVR, HIV1, HIV12, HIVPC, HIVRPI    No results found for: VANCT, CPK    Lab Results   Component Value Date/Time    Color YELLOW/STRAW 10/29/2019 08:00 AM    Appearance CLEAR 10/29/2019 08:00 AM    pH (UA) 5.0 10/29/2019 08:00 AM    Protein 30 (A) 10/29/2019 08:00 AM    Glucose NEGATIVE  10/29/2019 08:00 AM    Ketone NEGATIVE  10/29/2019 08:00 AM    Bilirubin NEGATIVE  10/29/2019 08:00 AM    Blood TRACE (A) 10/29/2019 08:00 AM    Urobilinogen 0.2 10/29/2019 08:00 AM    Nitrites NEGATIVE  10/29/2019 08:00 AM    Leukocyte Esterase NEGATIVE  10/29/2019 08:00 AM    WBC 0-4 10/29/2019 08:00 AM    RBC 0-5 10/29/2019 08:00 AM    Bacteria NEGATIVE  10/29/2019 08:00 AM       IMPRESSION  · Acute hypoxic and hypercapnic respiratory failure  · Pulmonary edema  · CKD    PLAN  · Continue on BiPAP until more alert and can try off if WOB is not increased  · Goal sats >90%  · Diuresis  · TTE, cardiology consulted  · Empiric abx  · Follow-up cultures  · PNA workup  · RVP      Thank you for allowing us to participate in the care of this patient. We will be happy to follow along in his/her progress with you.     Moo Stockton MD

## 2019-10-29 NOTE — ED NOTES
Patient Throughput:  Charge nurse on [facility] made aware of patient's room assignment, room 335    Current MEWS score of 1          Gonzales Ohara RN  Shift Resource Nurse  Emergency Department

## 2019-10-29 NOTE — ED NOTES
TRANSFER - OUT REPORT:    Verbal report given to MATEUS Portillo(name) on Stephania Galeazzi  being transferred to [facility](unit) for routine progression of care       Report consisted of patients Situation, Background, Assessment and   Recommendations(SBAR). Information from the following report(s) SBAR, Kardex, ED Summary, [facility] and Recent Results was reviewed with the receiving nurse. Lines:   Peripheral IV 10/29/19 Left Forearm (Active)   Site Assessment Clean, dry, & intact 10/29/2019  7:28 AM   Phlebitis Assessment 0 10/29/2019  7:28 AM   Infiltration Assessment 0 10/29/2019  7:28 AM   Dressing Status Clean, dry, & intact 10/29/2019  7:28 AM   Dressing Type Tape;Transparent 10/29/2019  7:28 AM   Hub Color/Line Status Green;Flushed;Patent 10/29/2019  7:28 AM   Action Taken Blood drawn 10/29/2019  7:28 AM   Alcohol Cap Used Yes 10/29/2019  7:28 AM       Peripheral IV 10/29/19 Right Antecubital (Active)   Site Assessment Clean, dry, & intact 10/29/2019  7:28 AM   Phlebitis Assessment 0 10/29/2019  7:28 AM   Infiltration Assessment 0 10/29/2019  7:28 AM   Dressing Status Clean, dry, & intact 10/29/2019  7:28 AM   Dressing Type Tape;Transparent 10/29/2019  7:28 AM   Hub Color/Line Status Green;Flushed;Patent 10/29/2019  7:28 AM   Action Taken Blood drawn 10/29/2019  7:28 AM   Alcohol Cap Used Yes 10/29/2019  7:28 AM        Opportunity for questions and clarification was provided.       Patient transported with:   Monitor  O2 @ Bipap liters  Registered Nurse

## 2019-10-29 NOTE — ED TRIAGE NOTES
Pt arrives via EMS for increased SOB for past week but significantly worsening in the past 24 hours. Pt has been using nebulizer treatment with moderate improvement for past week this but this am felt no relief with neb treatment. Per EMS pt with rales throughout all lung fields, edema to bilaterally with right ankle worse than left. Pt     92% on 3L initially and improved to 98% on nonrebreather. Per staff at [facility] with cough and no recent illness.

## 2019-10-29 NOTE — ED PROVIDER NOTES
80 y.o. male with significant past medical history for stroke, HTN, depression, COPD, pacemaker, a-fib on Eliquis, hyperlipidemia, and gout who presents from [facility] via EMS with chief complaint of shortness of breath. Pt complains of shortness of breath for the past week that worsened last night with associated cough. Per EMS, the pt had been using nebulizer treatments with relief until today when he had no relief with the treatment. EMS noted bilateral leg swelling, but pt states that is chronic. Family reports the pt has a hx of aspiration. Pt is a DNR. Pt is anticoagulated on eliquis. Pt denies using home oxygen. Pt denies abdominal pain, fever, chills, chest pain, or fatigue. There are no other acute medical concerns at this time. Social hx: Lives at [facility]. PCP: No primary care provider on file. Note written by Boyce Libman. Liat Fields, as dictated by Omar Gardner MD 7:25 AM      The history is provided by the patient, the EMS personnel and the nursing home. No past medical history on file. No past surgical history on file. No family history on file.     Social History     Socioeconomic History    Marital status: Not on file     Spouse name: Not on file    Number of children: Not on file    Years of education: Not on file    Highest education level: Not on file   Occupational History    Not on file   Social Needs    Financial resource strain: Not on file    Food insecurity:     Worry: Not on file     Inability: Not on file    Transportation needs:     Medical: Not on file     Non-medical: Not on file   Tobacco Use    Smoking status: Not on file   Substance and Sexual Activity    Alcohol use: Not on file    Drug use: Not on file    Sexual activity: Not on file   Lifestyle    Physical activity:     Days per week: Not on file     Minutes per session: Not on file    Stress: Not on file   Relationships    Social connections:     Talks on phone: Not on file     Gets together: Not on file     Attends Buddhism service: Not on file     Active member of club or organization: Not on file     Attends meetings of clubs or organizations: Not on file     Relationship status: Not on file    Intimate partner violence:     Fear of current or ex partner: Not on file     Emotionally abused: Not on file     Physically abused: Not on file     Forced sexual activity: Not on file   Other Topics Concern    Not on file   Social History Narrative    Not on file         ALLERGIES: Patient has no known allergies. Review of Systems   Constitutional: Negative for chills and fever. HENT: Negative for congestion. Eyes: Negative for visual disturbance. Respiratory: Positive for cough and shortness of breath. Negative for chest tightness. Cardiovascular: Positive for leg swelling. Negative for chest pain. Gastrointestinal: Negative for abdominal pain, nausea and vomiting. Genitourinary: Negative for dysuria and hematuria. Musculoskeletal: Negative for arthralgias and myalgias. Skin: Negative for rash. Neurological: Negative for dizziness, weakness and headaches. Psychiatric/Behavioral: Negative for suicidal ideas. All other systems reviewed and are negative. Vitals:    10/29/19 0722   BP: 165/68   Pulse: 68   Resp: (!) 45   Temp: 97.5 °F (36.4 °C)   SpO2: 99%   Weight: 79.4 kg (175 lb)   Height: 5' 8\" (1.727 m)            Physical Exam   Constitutional: He is oriented to person, place, and time. He appears well-developed. He appears ill (Moderate. ). He appears distressed. HENT:   Head: Normocephalic and atraumatic. Eyes: Conjunctivae are normal. No scleral icterus. Neck: Neck supple. No tracheal deviation present. Cardiovascular: Normal rate, regular rhythm, normal heart sounds and intact distal pulses. Exam reveals no gallop and no friction rub. No murmur heard. Pulmonary/Chest: Tachypnea noted.  He is in respiratory distress (Moderate to severe. ). He has wheezes (Diffuse coarse expiratory wheezes. ). He has no rales. Increased work of breathing. Abdominal: Soft. He exhibits no distension. There is no tenderness. There is no rebound and no guarding. Musculoskeletal:        Right lower leg: He exhibits edema. Left lower leg: He exhibits edema. Bilateral lower leg and pedal edema. Neurological: He is alert and oriented to person, place, and time. Skin: Skin is warm and dry. No rash noted. Psychiatric: He has a normal mood and affect. Nursing note and vitals reviewed. Note written by Geeta Parra, as dictated by Debra Tariq MD 7:30 AM      MDM       Procedures  ED EKG interpretation:  Rhythm: paced; and regular . Rate (approx.): 63; Axis: normal; ST/T wave: normal; Inferior Q waves. Note written by Geeta Robertson. Promise Parra, as dictated by Debra Tariq MD 7:41 AM    Hospitalist TigerText for Admission  8:32 AM    ED Room Number: EM93/44  Patient Name and age:  Charmian Burkitt 80 y.o.  male  Working Diagnosis: acute resp failure on BiPAO  Readmission: no  Isolation Requirements:  no  Recommended Level of Care:  step down  Code Status:  Do Not Resuscitate  Department:[facility] ED - [phone]  Other: Presents from [facility] with increasing respiratory distress over the past week. He has had a cough. No fever. He has diffuse, coarse expiratory wheezing. He is on BiPAP and getting nebs. I have ordered cefepime and Levaquin. His chest x-ray shows evidence of CHF with possible basilar infiltrates. I have just ordered Lasix as well. His blood pressure has been stable. His lactate is normal.      CONSULT NOTE:  8:36 AM Debra Tariq MD communicated with Dr. Marya Schmitt, Consult for Hospitalist via [facility] Text. Discussed available diagnostic tests and clinical findings. Dr. Marya Schmitt will admit the patient to the hospital.      Total critical care time spent exclusive of procedures:  37 min. Kimmy Schmitt MD  9:07 AM

## 2019-10-29 NOTE — PROGRESS NOTES
10/29/2019  1:19 PM    CM attempted to meet w/ pt for d/c planning, pt sleeping on BiPaP, no family at bedside. CM placed TC to pt's Dtr Rangel Runner (C) [phone] and (H) [phone] [facility].   Laura Muñoz

## 2019-10-29 NOTE — PROGRESS NOTES
Bedside and Verbal shift change report given to Prince Terrazas RN (oncoming nurse) by Chino Day RN (offgoing nurse). Report included the following information SBAR, Kardex, Intake/Output, MAR, Recent Results, Med Rec Status and Cardiac Rhythm NSR. Bedside and Verbal shift change report given to Rodriguez Hamm RN (oncoming nurse) by Prince Terrazas RN (offgoing nurse). Report included the following information SBAR, Kardex, Procedure Summary, Intake/Output, MAR, Recent Results, Med Rec Status and Cardiac Rhythm NSR.

## 2019-10-29 NOTE — PROGRESS NOTES
[facility]: MED RECONCILIATION    Comments/Recommendations:   PTA list updated from the paperwork provided from [facility] at [address] [facility]. Pharmacist called the facility and spoke with the nurse Asaf Ashraf to confirm last doses. Allergies: Patient has no known allergies. Prior to Admission Medications:     Medication Documentation Review Audit       Reviewed by Shakeel Restrepo PHARMD (Pharmacist) on 10/29/19 at 1343      Medication Sig Documenting Provider Last Dose Status Taking?   acetaminophen (TYLENOL) 325 mg tablet Take 650 mg by mouth every four (4) hours as needed for Pain. Cal Malone MD  Active Yes   albuterol (PROVENTIL VENTOLIN) 2.5 mg /3 mL (0.083 %) nebu 2.5 mg by Nebulization route every six (6) hours as needed (shortness of breath). Provider, Historical 10/26/2019 Active Yes   apixaban (ELIQUIS) 2.5 mg tablet Take 2.5 mg by mouth two (2) times a day. Cal Malone MD  Active Yes   brinzolamide (AZOPT) 1 % ophthalmic suspension Administer 1 Drop to both eyes three (3) times daily. Cal Malone MD 10/28/2019 Unknown time Active Yes   carboxymethylcellulose sodium (REFRESH TEARS) 0.5 % drop ophthalmic solution Administer 2 Drops to both eyes three (3) times daily. Cal Malone MD 10/28/2019 Unknown time Active Yes   carvedilol (COREG) 3.125 mg tablet Take 3.125 mg by mouth two (2) times a day. Cal Malone MD 10/28/2019 Unknown time Active Yes   cholecalciferol (VITAMIN D3) (1000 Units /25 mcg) tablet Take 1,000 Units by mouth daily. Cal Malone MD 10/28/2019 Unknown time Active Yes   colchicine 0.6 mg tablet Take 0.6 mg by mouth two (2) times daily as needed (gout attack). Cal Malone MD  Active Yes   glucosamine-chondroitin (ARTHX) 500-400 mg cap Take 2 Caps by mouth two (2) times a day. Cal Malone MD 10/28/2019 Unknown time Active Yes   loratadine (CLARITIN) 10 mg tablet Take 10 mg by mouth daily.  Cal Malone MD 10/28/2019 Unknown time Active Yes   losartan (COZAAR) 25 mg tablet Take 25 mg by mouth daily. Cal Malone MD 10/28/2019 Unknown time Active Yes   mirabegron ER (MYRBETRIQ) 50 mg ER tablet Take 50 mg by mouth daily. Cal Malone MD 10/28/2019 Unknown time Active Yes   mirtazapine (REMERON) 15 mg tablet Take 15 mg by mouth nightly. Cal Malone MD 10/28/2019 Unknown time Active Yes   sertraline (ZOLOFT) 50 mg tablet Take 50 mg by mouth daily. Indications: major depressive disorder Cal Malone MD 10/28/2019 Unknown time Active Yes   simvastatin (ZOCOR) 20 mg tablet Take 20 mg by mouth nightly. Cal Malone MD 10/28/2019 Unknown time Active Yes   therapeutic multivitamin (THERAGRAN) tablet Take 1 Tab by mouth daily.  Provider, Historical 10/28/2019 Unknown time Active Yes                  Thank you,      Angelo Chung, PharmD, BCPS

## 2019-10-30 LAB
ALBUMIN SERPL-MCNC: 3.2 G/DL (ref 3.5–5)
ALBUMIN/GLOB SERPL: 0.9 {RATIO} (ref 1.1–2.2)
ALP SERPL-CCNC: 76 U/L (ref 45–117)
ALT SERPL-CCNC: 15 U/L (ref 12–78)
ANION GAP SERPL CALC-SCNC: 8 MMOL/L (ref 5–15)
AST SERPL-CCNC: 20 U/L (ref 15–37)
BASOPHILS # BLD: 0 K/UL (ref 0–0.1)
BASOPHILS NFR BLD: 0 % (ref 0–1)
BILIRUB SERPL-MCNC: 0.5 MG/DL (ref 0.2–1)
BUN SERPL-MCNC: 40 MG/DL (ref 6–20)
BUN/CREAT SERPL: 20 (ref 12–20)
CALCIUM SERPL-MCNC: 8.7 MG/DL (ref 8.5–10.1)
CHLORIDE SERPL-SCNC: 107 MMOL/L (ref 97–108)
CO2 SERPL-SCNC: 28 MMOL/L (ref 21–32)
CREAT SERPL-MCNC: 1.99 MG/DL (ref 0.7–1.3)
DIFFERENTIAL METHOD BLD: ABNORMAL
EOSINOPHIL # BLD: 0.2 K/UL (ref 0–0.4)
EOSINOPHIL NFR BLD: 4 % (ref 0–7)
ERYTHROCYTE [DISTWIDTH] IN BLOOD BY AUTOMATED COUNT: 13.2 % (ref 11.5–14.5)
GLOBULIN SER CALC-MCNC: 3.6 G/DL (ref 2–4)
GLUCOSE SERPL-MCNC: 98 MG/DL (ref 65–100)
HCT VFR BLD AUTO: 33 % (ref 36.6–50.3)
HGB BLD-MCNC: 10.6 G/DL (ref 12.1–17)
IMM GRANULOCYTES # BLD AUTO: 0 K/UL (ref 0–0.04)
IMM GRANULOCYTES NFR BLD AUTO: 0 % (ref 0–0.5)
LYMPHOCYTES # BLD: 0.6 K/UL (ref 0.8–3.5)
LYMPHOCYTES NFR BLD: 12 % (ref 12–49)
MAGNESIUM SERPL-MCNC: 2 MG/DL (ref 1.6–2.4)
MCH RBC QN AUTO: 32.1 PG (ref 26–34)
MCHC RBC AUTO-ENTMCNC: 32.1 G/DL (ref 30–36.5)
MCV RBC AUTO: 100 FL (ref 80–99)
MONOCYTES # BLD: 0.4 K/UL (ref 0–1)
MONOCYTES NFR BLD: 8 % (ref 5–13)
NEUTS SEG # BLD: 3.9 K/UL (ref 1.8–8)
NEUTS SEG NFR BLD: 76 % (ref 32–75)
NRBC # BLD: 0 K/UL (ref 0–0.01)
NRBC BLD-RTO: 0 PER 100 WBC
PHOSPHATE SERPL-MCNC: 4 MG/DL (ref 2.6–4.7)
PLATELET # BLD AUTO: 146 K/UL (ref 150–400)
PMV BLD AUTO: 11.4 FL (ref 8.9–12.9)
POTASSIUM SERPL-SCNC: 4 MMOL/L (ref 3.5–5.1)
PROT SERPL-MCNC: 6.8 G/DL (ref 6.4–8.2)
RBC # BLD AUTO: 3.3 M/UL (ref 4.1–5.7)
SODIUM SERPL-SCNC: 143 MMOL/L (ref 136–145)
WBC # BLD AUTO: 5.1 K/UL (ref 4.1–11.1)

## 2019-10-30 PROCEDURE — 94760 N-INVAS EAR/PLS OXIMETRY 1: CPT

## 2019-10-30 PROCEDURE — 84100 ASSAY OF PHOSPHORUS: CPT

## 2019-10-30 PROCEDURE — 87449 NOS EACH ORGANISM AG IA: CPT

## 2019-10-30 PROCEDURE — 74011000258 HC RX REV CODE- 258: Performed by: INTERNAL MEDICINE

## 2019-10-30 PROCEDURE — 80053 COMPREHEN METABOLIC PANEL: CPT

## 2019-10-30 PROCEDURE — 97162 PT EVAL MOD COMPLEX 30 MIN: CPT

## 2019-10-30 PROCEDURE — 86738 MYCOPLASMA ANTIBODY: CPT

## 2019-10-30 PROCEDURE — 36415 COLL VENOUS BLD VENIPUNCTURE: CPT

## 2019-10-30 PROCEDURE — 87899 AGENT NOS ASSAY W/OPTIC: CPT

## 2019-10-30 PROCEDURE — 65660000000 HC RM CCU STEPDOWN

## 2019-10-30 PROCEDURE — 97530 THERAPEUTIC ACTIVITIES: CPT

## 2019-10-30 PROCEDURE — 97116 GAIT TRAINING THERAPY: CPT

## 2019-10-30 PROCEDURE — 74011250637 HC RX REV CODE- 250/637: Performed by: INTERNAL MEDICINE

## 2019-10-30 PROCEDURE — 74011250636 HC RX REV CODE- 250/636: Performed by: INTERNAL MEDICINE

## 2019-10-30 PROCEDURE — 83735 ASSAY OF MAGNESIUM: CPT

## 2019-10-30 PROCEDURE — 77010033678 HC OXYGEN DAILY

## 2019-10-30 PROCEDURE — 92610 EVALUATE SWALLOWING FUNCTION: CPT

## 2019-10-30 PROCEDURE — 85025 COMPLETE CBC W/AUTO DIFF WBC: CPT

## 2019-10-30 RX ORDER — FUROSEMIDE 10 MG/ML
40 INJECTION INTRAMUSCULAR; INTRAVENOUS DAILY
Status: DISCONTINUED | OUTPATIENT
Start: 2019-10-31 | End: 2019-10-31

## 2019-10-30 RX ADMIN — LORATADINE 10 MG: 10 TABLET ORAL at 21:33

## 2019-10-30 RX ADMIN — PIPERACILLIN AND TAZOBACTAM 3.38 G: 3; .375 INJECTION, POWDER, LYOPHILIZED, FOR SOLUTION INTRAVENOUS at 01:13

## 2019-10-30 RX ADMIN — APIXABAN 2.5 MG: 2.5 TABLET, FILM COATED ORAL at 21:32

## 2019-10-30 RX ADMIN — APIXABAN 2.5 MG: 2.5 TABLET, FILM COATED ORAL at 08:37

## 2019-10-30 RX ADMIN — Medication 10 ML: at 22:00

## 2019-10-30 RX ADMIN — MIRABEGRON 50 MG: 25 TABLET, FILM COATED, EXTENDED RELEASE ORAL at 21:32

## 2019-10-30 RX ADMIN — CARVEDILOL 3.12 MG: 3.12 TABLET, FILM COATED ORAL at 18:01

## 2019-10-30 RX ADMIN — MIRTAZAPINE 15 MG: 15 TABLET, FILM COATED ORAL at 21:32

## 2019-10-30 RX ADMIN — DORZOLAMIDE HYDROCHLORIDE 1 DROP: 20 SOLUTION/ DROPS OPHTHALMIC at 21:36

## 2019-10-30 RX ADMIN — Medication 10 ML: at 08:51

## 2019-10-30 RX ADMIN — DOXYCYCLINE 100 MG: 100 INJECTION, POWDER, LYOPHILIZED, FOR SOLUTION INTRAVENOUS at 08:37

## 2019-10-30 RX ADMIN — PIPERACILLIN AND TAZOBACTAM 3.38 G: 3; .375 INJECTION, POWDER, LYOPHILIZED, FOR SOLUTION INTRAVENOUS at 08:38

## 2019-10-30 RX ADMIN — Medication 10 ML: at 08:50

## 2019-10-30 RX ADMIN — PIPERACILLIN AND TAZOBACTAM 3.38 G: 3; .375 INJECTION, POWDER, LYOPHILIZED, FOR SOLUTION INTRAVENOUS at 18:01

## 2019-10-30 RX ADMIN — DOXYCYCLINE 100 MG: 100 INJECTION, POWDER, LYOPHILIZED, FOR SOLUTION INTRAVENOUS at 21:32

## 2019-10-30 RX ADMIN — FUROSEMIDE 40 MG: 10 INJECTION, SOLUTION INTRAMUSCULAR; INTRAVENOUS at 08:37

## 2019-10-30 RX ADMIN — Medication 10 ML: at 14:00

## 2019-10-30 RX ADMIN — DORZOLAMIDE HYDROCHLORIDE 1 DROP: 20 SOLUTION/ DROPS OPHTHALMIC at 08:37

## 2019-10-30 RX ADMIN — SERTRALINE HYDROCHLORIDE 50 MG: 50 TABLET ORAL at 21:32

## 2019-10-30 RX ADMIN — DORZOLAMIDE HYDROCHLORIDE 1 DROP: 20 SOLUTION/ DROPS OPHTHALMIC at 18:01

## 2019-10-30 RX ADMIN — CARVEDILOL 3.12 MG: 3.12 TABLET, FILM COATED ORAL at 08:36

## 2019-10-30 RX ADMIN — SIMVASTATIN 20 MG: 20 TABLET, FILM COATED ORAL at 21:32

## 2019-10-30 NOTE — WOUND CARE
Wound care consult: 
Initial visit for skin and wound assessment, alert, no distress Family at bedside. Assessment All skin folds and bony prominences assessed, turned with staff assistance. Condom cath in place. Skin is thin and fragile. Sacral area has large area of blanching redness, skin intact- pink and moist from incontinence. Heels intact, red and blanching- skin is dry, off loading boots in place. Treatment Incontinent care given - zinc applied. Repositioned in bed Heels floated Recommendations/Plan Low air loss surface ordered Turn, reposition every 2 hours as tolerated, float heels, place in off loading boots Incontinent care with comfort shields. Apply Zinc to all open areas, moisture barrier as needed. Will follow, reconsult as needed.  
[address]

## 2019-10-30 NOTE — PROGRESS NOTES
Problem: Mobility Impaired (Adult and Pediatric)  Goal: *Acute Goals and Plan of Care (Insert Text)  Description  FUNCTIONAL STATUS PRIOR TO ADMISSION: Patient required moderate assistance for basic and instrumental ADLs. Feed self per dth with modified utensils. The patient was functional at the wheelchair level propelling with BLE's and was Mod A for transfers to the chair. HOME SUPPORT PRIOR TO ADMISSION: The patient lived in [address] [place] [address] at [facility]. Physical Therapy Goals  Initiated 10/30/2019  1. Patient will move from supine to sit and sit to supine  in bed with minimal assistance/contact guard assist within 7 day(s). 2.  Patient will transfer from bed to chair and chair to bed with minimal assistance/contact guard assist using the least restrictive device within 7 day(s). 3.  Patient will perform sit to stand with minimal assistance/contact guard assist within 7 day(s). 4.  Patient will ambulate with moderate assistance  for 50 feet with the least restrictive device within 7 day(s). Outcome: Progressing Towards Goal   PHYSICAL THERAPY EVALUATION  Patient: Barbara Calero (53 y.o. male)  Date: 10/30/2019  Primary Diagnosis: Acute respiratory failure with hypoxia and hypercapnia (HCC) [J96.01, J96.02]        Precautions:   Fall; DNR      ASSESSMENT  Based on the objective data described below, the patient presents with admission due to acute respiratory failure with hypoxia/hypercpnia/SOB for 1wk. Pt lives at [address] [place] [address] and functions at w/c level propelling self with BLE's per dtr. His pmhx includes pacemaker, CHF, CVA in 2017 and TIA likely per dtr in June. He is received A&Ox4 supine in bed on 3LO2. Pt required Mod A x2 with supine to sit and scooting to EOB. Sitting balance is compromised leaning posteriorly and needing constant support. Sit to stand with RW with Mod Ax2 sidestepping 2' to [facility]. Balance is fair to poor and this pt is a high fall risk.   Overall weakness and low activity tolerance, yet very cooperative and wanting to participate. Dtr requesting OT eval for feeding and utensil modification. RN alerted. Current Level of Function Impacting Discharge (mobility/balance): Mod Ax2 with sit to stand and gait/ Total A with sit to supine/fair to poor    Functional Outcome Measure: The patient scored 20/100 on the barthel outcome measure which is indicative of 60-79% functional impairment. Other factors to consider for discharge: returning to [facility]     Patient will benefit from skilled therapy intervention to address the above noted impairments. PLAN :  Recommendations and Planned Interventions: bed mobility training, transfer training, gait training, therapeutic exercises, patient and family training/education and therapeutic activities      Frequency/Duration: Patient will be followed by physical therapy:  5 times a week to address goals. Recommendation for discharge: (in order for the patient to meet his/her long term goals)  Therapy up to 5 days/week in SNF setting    This discharge recommendation:  Has been made in collaboration with the attending provider and/or case management    IF patient discharges home will need the following DME: has RW and w/c          SUBJECTIVE:   Patient stated what are we getting ready to do.     OBJECTIVE DATA SUMMARY:   HISTORY:    Past Medical History:   Diagnosis Date    Allergic rhinitis     Arthritis     Atrial fibrillation (HCC)     Benign prostatic hyperplasia     CAD (coronary artery disease)     Cerebral infarction ([facility] [address]     Chronic kidney disease     Chronic pain     Dry eye syndrome     Dysphagia     Glaucoma     Gout     Hearing loss, bilateral     Hemiplegia (HCC)     left side    Hyperlipemia     Hypertension     Major depressive disorder     Osteoarthritis     Overactive bladder     Pacemaker     Polyarthritis     Urinary incontinence     Vitamin D deficiency    History reviewed.  No pertinent surgical history. Personal factors and/or comorbidities impacting plan of care: see above and below    Home Situation  Home Environment: Long term care([facility])  One/Two Story Residence: One story  Support Systems: Child(isael), Home care staff  Patient Expects to be Discharged to[de-identified] Skilled nursing facility  Current DME Used/Available at Home: Wheelchair, Walker, rolling  Tub or Shower Type: Shower    EXAMINATION/PRESENTATION/DECISION MAKING:   Critical Behavior:  Neurologic State: Alert  Orientation Level: Oriented to person, Oriented to place, Oriented to time, Oriented to situation, Oriented X4  Cognition: Follows commands  Safety/Judgement: Insight into deficits, Fall prevention  Hearing: Auditory  Auditory Impairment: None  Skin:  IV; condom catheter  Edema: BLE's    Range Of Motion:  AROM: Generally decreased, functional                       Strength:    Strength: Generally decreased, functional                    Tone & Sensation:   Tone: Normal                              Coordination:  Coordination: Generally decreased, functional  Vision:      Functional Mobility:  Bed Mobility:  Rolling: Minimum assistance  Supine to Sit: Moderate assistance;Assist x2  Sit to Supine: Maximum assistance;Assist x2  Scooting: Moderate assistance;Assist x2  Transfers:  Sit to Stand: Moderate assistance;Assist x2  Stand to Sit: Moderate assistance;Assist x2                       Balance:   Sitting: Impaired;High guard  Sitting - Static: Fair (occasional)  Sitting - Dynamic: Poor (constant support)  Standing: Impaired  Standing - Static: Constant support; Fair  Standing - Dynamic : Poor;Constant support  Ambulation/Gait Training:  Distance (ft): 2 Feet (ft)(side stepping to [facility])  Assistive Device: Walker, rolling;Gait belt  Ambulation - Level of Assistance: Moderate assistance;Assist x2                 Base of Support: Narrowed; Center of gravity altered     Speed/Roxie: Slow;Pace decreased (<100 feet/min); Shuffled  Step Length: Left shortened;Right shortened                    Functional Measure:  Barthel Index:    Bathin  Bladder: 0  Bowels: 5  Groomin  Dressin  Feedin  Mobility: 0  Stairs: 0  Toilet Use: 0  Transfer (Bed to Chair and Back): 5  Total: 20/100       The Barthel ADL Index: Guidelines  1. The index should be used as a record of what a patient does, not as a record of what a patient could do. 2. The main aim is to establish degree of independence from any help, physical or verbal, however minor and for whatever reason. 3. The need for supervision renders the patient not independent. 4. A patient's performance should be established using the best available evidence. Asking the patient, friends/relatives and nurses are the usual sources, but direct observation and common sense are also important. However direct testing is not needed. 5. Usually the patient's performance over the preceding 24-48 hours is important, but occasionally longer periods will be relevant. 6. Middle categories imply that the patient supplies over 50 per cent of the effort. 7. Use of aids to be independent is allowed. Miah Short., Barthel, D.W. ([id number]). Functional evaluation: the Barthel Index. [address] [facility] (14)2. RACHNA Gray, Cee Odell., [facility] Center[facility]my Hatchet.[facility], [address] [place] [address] (). Measuring the change indisability after inpatient rehabilitation; comparison of the responsiveness of the Barthel Index and Functional Larue Measure. Journal of Neurology, Neurosurgery, and Psychiatry, 66(4), 132-280. Anastasiia Saul, N.J.A, NATAN Otero, & Giuseppe Baptiste MLindseyA. (2004.) Assessment of post-stroke quality of life in cost-effectiveness studies: The usefulness of the Barthel Index and the EuroQoL-5D.  Quality of Life Research, 15, 577-76           Physical Therapy Evaluation Charge Determination   History Examination Presentation Decision-Making   HIGH Complexity :3+ comorbidities / personal factors will impact the outcome/ POC  MEDIUM Complexity : 3 Standardized tests and measures addressing body structure, function, activity limitation and / or participation in recreation  MEDIUM Complexity : Evolving with changing characteristics  Other outcome measures barthel  HIGH       Based on the above components, the patient evaluation is determined to be of the following complexity level: MEDIUM    Activity Tolerance:   Fair  Please refer to the flowsheet for vital signs taken during this treatment. After treatment patient left in no apparent distress:   Supine in bed, Heels elevated for pressure relief, Call bell within reach and Caregiver / family present    COMMUNICATION/EDUCATION:   The patients plan of care was discussed with: Registered Nurse. Fall prevention education was provided and the patient/caregiver indicated understanding., Patient/family have participated as able in goal setting and plan of care. and Patient/family agree to work toward stated goals and plan of care.     Thank you for this referral.  Be Lainez, PT   Time Calculation: 38 mins

## 2019-10-30 NOTE — PROGRESS NOTES
Name: Serina Braxton: [address]   : 1923 Admit Date: 10/29/2019   Phone: [phone]  Room: [facility]/01   PCP: Moon Naidu MD  MRN: [id number]   Date: 10/30/2019  Code: DNR          Chart and notes reviewed. Data reviewed. I review the patient's current medications in the medical record at each encounter. I have evaluated and examined the patient. History of Present Illness:  Mr. Neil Ivan is a 79 yo gentleman with a history of prior CVA, afib s/p PPM, CAD, and CKD who is admitted with acute hypoxic and hypercapnic respiratory failure. Patient is unable to provide any history and history was obtained via chart review and from other medical providers. He has been short of breath for about a week. Noted to have pedal edema on exam and CXR with effusion and pulmonary edema. Placed on BiPAP in the ED for increased work of breathing and now appears comfortable. Labs: WBC 6.1, cr 1.75, proBNP 3677, troponin <0.05    Images:  CXR with bilateral effusions and pulmonary edema    Interval history  Afebrile  Sats 98% on 5L  Creat 1.99  Repeat ABG 7.39/45/69/27 on 3L  RVP neg  I/O: - 2075 ml  ECHO: EF 73-82%; grade 1 diastolic dysfunction; mild to mod AVR    ROS: Feeling better he thinks this morning. Sitting up in bed eating breakfast.  Denies fever or chills. Denies CP. Denies abd pain. LE/feet swelling seems better.       Past Medical History:   Diagnosis Date    Allergic rhinitis     Arthritis     Atrial fibrillation (HCC)     Benign prostatic hyperplasia     CAD (coronary artery disease)     Cerebral infarction (HCC)     Chronic kidney disease     Chronic pain     Dry eye syndrome     Dysphagia     Glaucoma     Gout     Hearing loss, bilateral     Hemiplegia (HCC)     left side    Hyperlipemia     Hypertension     Major depressive disorder     Osteoarthritis     Overactive bladder     Pacemaker     Polyarthritis     Urinary incontinence     Vitamin D deficiency        History reviewed. No pertinent surgical history. History reviewed. No pertinent family history.     Social History     Tobacco Use    Smoking status: Not on file   Substance Use Topics    Alcohol use: Not on file       No Known Allergies    Current Facility-Administered Medications   Medication Dose Route Frequency    sodium chloride (NS) flush 5-40 mL  5-40 mL IntraVENous Q8H    sodium chloride (NS) flush 5-40 mL  5-40 mL IntraVENous PRN    sodium chloride (NS) flush 5-40 mL  5-40 mL IntraVENous Q8H    sodium chloride (NS) flush 5-40 mL  5-40 mL IntraVENous PRN    acetaminophen (TYLENOL) tablet 650 mg  650 mg Oral Q6H PRN    naloxone (NARCAN) injection 0.4 mg  0.4 mg IntraVENous PRN    furosemide (LASIX) injection 40 mg  40 mg IntraVENous BID    albuterol-ipratropium (DUO-NEB) 2.5 MG-0.5 MG/3 ML  3 mL Nebulization Q6H PRN    piperacillin-tazobactam (ZOSYN) 3.375 g in 0.9% sodium chloride (MBP/ADV) 100 mL  3.375 g IntraVENous Q8H    doxycycline (VIBRAMYCIN) 100 mg in 0.9% sodium chloride (MBP/ADV) 100 mL  100 mg IntraVENous Q12H    apixaban (ELIQUIS) tablet 2.5 mg  2.5 mg Oral BID    carboxymethylcellulose sodium (CELLUVISC) 1 % ophthalmic solution 2 Drop  2 Drop Both Eyes DAILY PRN    carvedilol (COREG) tablet 3.125 mg  3.125 mg Oral BID WITH MEALS    cholecalciferol (VITAMIN D3) (1000 Units /25 mcg) tablet 1 Tab  1,000 Units Oral DAILY    colchicine tablet 0.6 mg  0.6 mg Oral BID PRN    loratadine (CLARITIN) tablet 10 mg  10 mg Oral DAILY    mirabegron ER (MYRBETRIQ) tablet 50 mg  50 mg Oral DAILY    mirtazapine (REMERON) tablet 15 mg  15 mg Oral QHS    sertraline (ZOLOFT) tablet 50 mg  50 mg Oral DAILY    simvastatin (ZOCOR) tablet 20 mg  20 mg Oral QHS    therapeutic multivitamin (THERAGRAN) tablet 1 Tab  1 Tab Oral DAILY    dorzolamide (TRUSOPT) 2 % ophthalmic solution 1 Drop  1 Drop Both Eyes TID         REVIEW OF SYSTEMS   12 point ROS negative except as stated in the HPI. Physical Exam:   Visit Vitals  /50 (BP 1 Location: Right arm, BP Patient Position: At rest)   Pulse 61   Temp 98 °F (36.7 °C)   Resp 17   Ht 5' 8\" (1.727 m)   Wt 78 kg (172 lb)   SpO2 98%   BMI 26.15 kg/m²       General:  Asleep, NAD   Head:  Normocephalic, without obvious abnormality, atraumatic. Eyes:  Conjunctivae/corneas clear. Nose: Nares normal. Septum midline. Mucosa normal.    Throat: Lips, mucosa, and tongue normal.    Neck: Supple, symmetrical, trachea midline, no adenopathy. Lungs:   Clear to auscultation bilaterally. Chest wall:  No tenderness or deformity. Heart:  Regular rate and rhythm, S1, S2 normal, no murmur, click, rub or gallop. Abdomen:   Soft, non-tender. Bowel sounds normal.   Extremities: Extremities normal, atraumatic, no cyanosis; +BLE edema   Pulses: 2+ and symmetric all extremities. Skin: Skin color, texture, turgor normal. No rashes or lesions   Lymph nodes: Cervical, supraclavicular nodes normal.   Neurologic: Grossly nonfocal       Lab Results   Component Value Date/Time    Sodium 143 10/30/2019 12:40 AM    Potassium 4.0 10/30/2019 12:40 AM    Chloride 107 10/30/2019 12:40 AM    CO2 28 10/30/2019 12:40 AM    BUN 40 (H) 10/30/2019 12:40 AM    Creatinine 1.99 (H) 10/30/2019 12:40 AM    Glucose 98 10/30/2019 12:40 AM    Calcium 8.7 10/30/2019 12:40 AM    Magnesium 2.0 10/30/2019 12:40 AM    Phosphorus 4.0 10/30/2019 12:40 AM       Lab Results   Component Value Date/Time    WBC 5.1 10/30/2019 12:40 AM    HGB 10.6 (L) 10/30/2019 12:40 AM    PLATELET 675 (L) 43/61/9851 12:40 AM    .0 (H) 10/30/2019 12:40 AM       Lab Results   Component Value Date/Time    AST (SGOT) 20 10/30/2019 12:40 AM    Alk.  phosphatase 76 10/30/2019 12:40 AM    Protein, total 6.8 10/30/2019 12:40 AM    Albumin 3.2 (L) 10/30/2019 12:40 AM    Globulin 3.6 10/30/2019 12:40 AM       No results found for: IRON, FE, TIBC, IBCT, PSAT, FERR    No results found for: SR, CRP, DARIN, ANAIGG, RA, RPR, RPRT, VDRLT, VDRLS, TSH, TSHEXT, TSHEXT     Lab Results   Component Value Date/Time    PH 7.39 10/29/2019 09:20 PM    PCO2 45 10/29/2019 09:20 PM    PO2 69 (L) 10/29/2019 09:20 PM    HCO3 27 (H) 10/29/2019 09:20 PM       Lab Results   Component Value Date/Time    Troponin-I, Qt. <0.05 10/29/2019 07:29 AM        Lab Results   Component Value Date/Time    Culture result: NO GROWTH AFTER 22 HOURS 10/29/2019 07:29 AM    Culture result: NO GROWTH AFTER 22 HOURS 10/29/2019 07:29 AM       No results found for: TOXA1, RPR, HBCM, HBSAG, HAAB, HCAB1, HAAT, G6PD, CRYAC, HIVGT, HIVR, HIV1, HIV12, HIVPC, HIVRPI    No results found for: VANCT, CPK    Lab Results   Component Value Date/Time    Color YELLOW/STRAW 10/29/2019 08:00 AM    Appearance CLEAR 10/29/2019 08:00 AM    pH (UA) 5.0 10/29/2019 08:00 AM    Protein 30 (A) 10/29/2019 08:00 AM    Glucose NEGATIVE  10/29/2019 08:00 AM    Ketone NEGATIVE  10/29/2019 08:00 AM    Bilirubin NEGATIVE  10/29/2019 08:00 AM    Blood TRACE (A) 10/29/2019 08:00 AM    Urobilinogen 0.2 10/29/2019 08:00 AM    Nitrites NEGATIVE  10/29/2019 08:00 AM    Leukocyte Esterase NEGATIVE  10/29/2019 08:00 AM    WBC 0-4 10/29/2019 08:00 AM    RBC 0-5 10/29/2019 08:00 AM    Bacteria NEGATIVE  10/29/2019 08:00 AM       IMPRESSION  · Acute hypoxic and hypercapnic respiratory failure  · Pulmonary edema  · CKD  · Afib, s/p PPM    PLAN  · Wean O2 as able for goal sats >90%  · Diuresis per Cardiology; watch creat and BP  · Empiric abx - Doxy/Zosyn; f/u cultures and pna work up  · Speech eval pending  · [address], [place]

## 2019-10-30 NOTE — NURSE NAVIGATOR
Chart reviewed by Heart Failure Nurse Navigator. Heart Failure database completed. HF Bundle patient     EF:  56 to 60% with grade 1 diastolic dysfunction, mild to moderate MR. ACEi/ARB/ARNi: Prior to admission, losartan 25 mg daily. Currently held due to kidney dysfunction. BB: Not indicated. Aldosterone Antagonist: **    Obstructive Sleep Apnea Screening:   STOP-BANG score:   Referred to Sleep Medicine:     CRT Not indicated. NYHA Functional Class **. Heart Failure Teach Back in Patient Education. Heart Failure Avoiding Triggers on Discharge Instructions. Cardiologist: Dr. Flanagan Check discharge follow up phone call to be made within 48-72 hours of discharge.

## 2019-10-30 NOTE — PROGRESS NOTES
CM Note:  Reason for Admission:   Acute respiratory failure with hypoxia and hypercapnia                  RRAT Score:     19             Do you (patient/family) have any concerns for transition/discharge? No              Plan for utilizing home health:   None; pt gets PT at LTC at [address] [place] [address]: On file            Transition of Care Plan:        1. PT/OT/speech consults  2. Wound care  3. Daughter to transport him back to [facility] at d/c. . Mayank Fletcher RN    Care Management Interventions  PCP Verified by CM: Yes(Dr. Dayton Flor. No NN.)  Mode of Transport at Discharge:  Other (see comment)(daughter)  Discharge Durable Medical Equipment: (w/c, RW)  Physical Therapy Consult: Yes  Occupational Therapy Consult: Yes  Speech Therapy Consult: Yes  Current Support Network: Nursing Facility(LTC at [facility])  Plan discussed with Pt/Family/Caregiver: Yes  Discharge Location  Discharge Placement: [address] [place] [address]

## 2019-10-30 NOTE — PROGRESS NOTES
Bedside and Verbal shift change report given to Lindsey (oncoming nurse) by Quinten Sprague (offgoing nurse). Report included the following information SBAR, Kardex and Cardiac Rhythm NSR A paced. 0820 patient stable this AM on 3L oxygen, AOx4.     1000 SLP eval completed, patient cleared for cardiac reg diet and thins. 1020 patient assigned bed on 5th floor. TRANSFER - OUT REPORT:    Verbal report given to Kita(name) on Latrice Adair  being transferred to remote tele(unit) for routine progression of care       Report consisted of patients Situation, Background, Assessment and   Recommendations(SBAR). Information from the following report(s) SBAR, Kardex and Cardiac Rhythm paced was reviewed with the receiving nurse. Lines:   Peripheral IV 10/29/19 Left Forearm (Active)   Site Assessment Clean, dry, & intact 10/30/2019  7:12 AM   Phlebitis Assessment 0 10/30/2019  7:12 AM   Infiltration Assessment 0 10/30/2019  7:12 AM   Dressing Status Clean, dry, & intact 10/30/2019  7:12 AM   Dressing Type Transparent 10/30/2019  7:12 AM   Hub Color/Line Status Green 10/30/2019  7:12 AM   Action Taken Open ports on tubing capped 10/30/2019  7:12 AM   Alcohol Cap Used Yes 10/30/2019  7:12 AM       Peripheral IV 10/29/19 Right Antecubital (Active)   Site Assessment Clean, dry, & intact 10/30/2019  7:12 AM   Phlebitis Assessment 0 10/30/2019  7:12 AM   Infiltration Assessment 0 10/30/2019  7:12 AM   Dressing Status Clean, dry, & intact 10/30/2019  7:12 AM   Dressing Type Transparent 10/30/2019  7:12 AM   Hub Color/Line Status Pink 10/30/2019  7:12 AM   Action Taken Open ports on tubing capped 10/30/2019  7:12 AM   Alcohol Cap Used Yes 10/30/2019  7:12 AM        Opportunity for questions and clarification was provided.       Patient transported with:   Monitor  O2 @ 3 liters  Tech

## 2019-10-30 NOTE — PROGRESS NOTES
Cardiology Progress Note                             [address] [facility], [address]                                 Phone [phone]; Fax [phone]        10/30/2019 10:23 AM     Admit Date:           10/29/2019  Admit Diagnosis:  Acute respiratory failure with hypoxia and hypercapnia ([address] [J96.01, J96.02]  :          1923   MRN:          [id number]   ASSESSMENT/RECOMMENDATION:   Acute hypoxic resp failure  Acute pulm edema  Conduction disease - a-paced, v-sensed  PAF without recurrence  Aspirating ? Very elderly  DNR     Echo shows pEF, no WMA. Mild to mod AR- cannot rule out bicuspid aortic valve  Great response to IV diuretics, however creatinine trending up. Hold evening dose of IV diuretics this evening and reassess IV vs PO tomorrow. Breathing comfortably, + juan exp wheezing on exam. Improvement in juan LE edema LE>RLE  Continue eliquis for embolic CVA prevention, remains in NSR  Speech evaluating for aspiration - high suspicion                    No intake/output data recorded. Last 3 Recorded Weights in this Encounter    10/29/19 0722 10/29/19 1537 10/30/19 0402   Weight: 175 lb (79.4 kg) 175 lb (79.4 kg) 172 lb (78 kg)         10/28 1901 - 10/30 0700  In: 300 [I.V.:300]  Out: 2375 [Urine:2375]    SUBJECTIVE               Yvette Cooler denies palpitations, irregular heart beat,  chest pain   Breathing has improved   + improvement in LE edema.          Current Facility-Administered Medications   Medication Dose Route Frequency    sodium chloride (NS) flush 5-40 mL  5-40 mL IntraVENous Q8H    sodium chloride (NS) flush 5-40 mL  5-40 mL IntraVENous PRN    sodium chloride (NS) flush 5-40 mL  5-40 mL IntraVENous Q8H    sodium chloride (NS) flush 5-40 mL  5-40 mL IntraVENous PRN    acetaminophen (TYLENOL) tablet 650 mg  650 mg Oral Q6H PRN    naloxone (NARCAN) injection 0.4 mg 0.4 mg IntraVENous PRN    furosemide (LASIX) injection 40 mg  40 mg IntraVENous BID    albuterol-ipratropium (DUO-NEB) 2.5 MG-0.5 MG/3 ML  3 mL Nebulization Q6H PRN    piperacillin-tazobactam (ZOSYN) 3.375 g in 0.9% sodium chloride (MBP/ADV) 100 mL  3.375 g IntraVENous Q8H    doxycycline (VIBRAMYCIN) 100 mg in 0.9% sodium chloride (MBP/ADV) 100 mL  100 mg IntraVENous Q12H    apixaban (ELIQUIS) tablet 2.5 mg  2.5 mg Oral BID    carboxymethylcellulose sodium (CELLUVISC) 1 % ophthalmic solution 2 Drop  2 Drop Both Eyes DAILY PRN    carvedilol (COREG) tablet 3.125 mg  3.125 mg Oral BID WITH MEALS    cholecalciferol (VITAMIN D3) (1000 Units /25 mcg) tablet 1 Tab  1,000 Units Oral DAILY    colchicine tablet 0.6 mg  0.6 mg Oral BID PRN    loratadine (CLARITIN) tablet 10 mg  10 mg Oral DAILY    mirabegron ER (MYRBETRIQ) tablet 50 mg  50 mg Oral DAILY    mirtazapine (REMERON) tablet 15 mg  15 mg Oral QHS    sertraline (ZOLOFT) tablet 50 mg  50 mg Oral DAILY    simvastatin (ZOCOR) tablet 20 mg  20 mg Oral QHS    therapeutic multivitamin (THERAGRAN) tablet 1 Tab  1 Tab Oral DAILY    dorzolamide (TRUSOPT) 2 % ophthalmic solution 1 Drop  1 Drop Both Eyes TID      OBJECTIVE               Intake/Output Summary (Last 24 hours) at 10/30/2019 1023  Last data filed at 10/30/2019 0544  Gross per 24 hour   Intake 300 ml   Output 2375 ml   Net -2075 ml       Review of Systems - History obtained from the patient AS PER  HPI    Telemetry A P    PHYSICAL EXAM        Visit Vitals  /50 (BP 1 Location: Right arm, BP Patient Position: At rest)   Pulse 61   Temp 98 °F (36.7 °C)   Resp 17   Ht 5' 8\" (1.727 m)   Wt 172 lb (78 kg)   SpO2 98%   BMI 26.15 kg/m²       Gen: Well-developed, elderly , in no acute distress  alert and oriented   HEENT:  Pink conjunctivae, Hearing grossly normal.No scleral icterus or conjunctival, moist mucous membranes  Neck: Supple,No JVD  Resp: No accessory muscle use, + exp wheezing juan/rhonchi  Card: Regular Rate,Rythm,Normal S1, S2, No murmurs, rubs or gallop. No thrills. GI:          soft, non-tender   MSK: No cyanosis or clubbing, good capillary refill  Skin: No rashes or ulcers, no bruising.  Left sided ppm scar unremarkable   Neuro:  Cranial nerves are grossly intact, moving all four extremities, no focal deficit, follows commands appropriately  Psych:  Good insight, oriented to person, place , alert, Nml Affect  LE: +2 pitting LLE/trace RLE  edema       DATA REVIEW            Laboratory and Imaging have been reviewed by me and are notable for  Recent Labs     10/29/19  0729   TROIQ <0.05     Recent Labs     10/30/19  0040 10/29/19  0729    142   K 4.0 4.7   CO2 28 26   BUN 40* 39*   CREA 1.99* 1.75*   GLU 98 159*   PHOS 4.0  --    MG 2.0 2.3   WBC 5.1 6.1   HGB 10.6* 11.4*   HCT 33.0* 35.9*   * [address], [place]

## 2019-10-30 NOTE — PROGRESS NOTES
Problem: Dysphagia (Adult)  Goal: *Acute Goals and Plan of Care (Insert Text)  Description  Dysphagia goals initiated 10-30-19  1.) tolerate cardiac regular, thins diet without s/s of aspiration by 11-1-19  2.) consider MBS if indicated per family and MD   Outcome: Progressing Towards Goal     SPEECH LANGUAGE PATHOLOGY BEDSIDE SWALLOW EVALUATION  Patient: Logan Guerra (23 y.o. male)  Date: 10/30/2019  Primary Diagnosis: Acute respiratory failure with hypoxia and hypercapnia (HCC) [J96.01, J96.02]        Precautions: aspiration       ASSESSMENT :  Based on the objective data described below, the patient presents with mild oral dysphagia characterized by oral holding and lingual pumping and mild-mod pharyngeal dysphagia characterized by multiple swallows per bolus. Overt s/s of aspiration were noted x2 with thins. Due to L hemiplegia, patient is unable to feed himself and currently relies on caregiver or other staff. Aspiration risks include 1) feeder status, 2) dependency for oral care, 3) multiple medical dx's (COPD, a-fib)-he may have difficulty with coordinating breathing and swallowing due to his multiple respiratory concerns, and 4) hx of dysphagia. These pulmonary issues seem to be improving since he has been off the bi-pap now for 15 hours. Patient was admitted 10-29 with increased SOB over the past week, which significantly worsened with an accompanied cough on the day of admission. Patient was completing neubulizer treatments prior to admission which offered moderate relief, but did not help at all on the day of admission. CXR: Evidence of congestive change. Presence of bilateral pleural effusions. Superimposed parenchymal disease at lung base may be present. PMH: CVA (2018 per family), HTN, depression, COPD, pacemaker, a-fib, hyperlipidemia, gout, hx of aspiration     Patient will benefit from skilled intervention to address the above impairments.   Patients rehabilitation potential is considered to be Good  Factors which may influence rehabilitation potential include:   ? None noted  ? Mental ability/status  ? Medical condition  ? Home/family situation and support systems  ? Safety awareness  ? Pain tolerance/management  ? Other:      PLAN :  Recommendations and Planned Interventions:  Continue cardiac regular diet, thins  Consider MBS if indicated by family or MD  Frequency/Duration: Patient will be followed by speech-language pathology 3 times a week to address goals. Discharge Recommendations: To Be Determined     SUBJECTIVE:   Patient stated i'm doing ok. OBJECTIVE:     Past Medical History:   Diagnosis Date    Allergic rhinitis     Arthritis     Atrial fibrillation (HCC)     Benign prostatic hyperplasia     CAD (coronary artery disease)     Cerebral infarction ([facility]     Chronic kidney disease     Chronic pain     Dry eye syndrome     Dysphagia     Glaucoma     Gout     Hearing loss, bilateral     Hemipleg[facility] ([facility])     left side    Hyperlipemia     Hypertension     Major depressive disorder     Osteoarthritis     Overactive bladder     Pacemaker     Polyarthritis     Urinary incontinence     Vitamin D deficiency    History reviewed. No pertinent surgical history. Prior Level of Function/Home Situation: long-term care     Diet prior to admission: regular, thins (although has been on modified diets in the past)  Current Diet:  cardiac regular, thins   Cognitive and Communication Status:  Neurologic State: Alert, Drowsy  Orientation Level: Disoriented to situation, Disoriented to time, Oriented to person, Oriented to place  Cognition: Follows commands  Perception: Appears intact  Perseveration: No perseveration noted  Safety/Judgement: Awareness of environment, Decreased insight into deficits  Oral Assessment:  Oral Assessment  Labial: No impairment  Dentition: Full; Intact(80%; missing few molars and caps present)  Oral Hygiene: WFL  Lingual: No impairment  Velum: No impairment  Mandible: No impairment  Gag Reflex: (deferred testing)  P.O. Trials:  Patient Position: patient sitting upright in bed  Vocal quality prior to P.O.: Wet;Hoarse(wet following PO trials; indicative of suspected pharyngeal residue)  Consistency Presented: Puree; Solid; Thin liquid  How Presented: SLP-fed/presented;Spoon;Straw  How Much: (5 sips of water; 2 bites of sheri crackers; 6 bites of applesauce)    ORAL PHASE:  Bolus Acceptance: No impairment  Bolus Formation/Control: Impaired  Type of Impairment: Delayed(mild oral holding)  Propulsion: Delayed (# of seconds); Tongue pumping  Oral Residue: None(required multiple swallows to clear one bolus)    PHARYNGEAL PHASE:  Initiation of Swallow: No impairment  Laryngeal Elevation: Decreased  Aspiration Signs/Symptoms: Change vocal quality;Strong cough(strong cough noted x1 on thins; changed vocal quality noted on thins x1)  Pharyngeal Phase Characteristics: No impairment, issues, or problems   Effective Modifications: None  Cues for Modifications: None  Comments: patient O2 sats <90; RR between 18 and 22            NOMS:   The NOMS functional outcome measure was used to quantify this patient's level of swallowing impairment. Based on the NOMS, the patient was determined to be at level 5 for swallow function       NOMS Swallowing Levels:  Level 1 (CN): NPO  Level 2 (CM): NPO but takes consistency in therapy  Level 3 (CL): Takes less than 50% of nutrition p.o. and continues with nonoral feedings; and/or safe with mod cues; and/or max diet restriction  Level 4 (CK):  Safe swallow but needs mod cues; and/or mod diet restriction; and/or still requires some nonoral feeding/supplements  Level 5 (CJ): Safe swallow with min diet restriction; and/or needs min cues  Level 6 (CI): Independent with p.o.; rare cues; usually self cues; may need to avoid some foods or needs extra time  Level 7 [facility]): Independent for all p.o.  MARIBELL. (2003). National Outcomes Measurement System (NOMS): Adult Speech-Language Pathology User's Guide. After treatment:   ?            Patient left in no apparent distress sitting up in chair  ? Patient left in no apparent distress in bed  ? Call bell left within reach  ? Nursing notified  ? Caregiver present  ? Bed alarm activated    COMMUNICATION/EDUCATION:   The patients plan of care including recommendations, planned interventions, and recommended diet changes were discussed with: Registered Nurse. Patient was educated regarding His deficit(s) of dysphagia as this relates to His diagnosis of acute respiratory failure. He demonstrated Fair understanding as evidenced by discussion. Caregiver was also present for discussion and demonstrated Good understanding. ? Posted safety precautions in patient's room. ? Patient/family have participated as able in goal setting and plan of care. ?            Patient/family agree to work toward stated goals and plan of care. ?            Patient understands intent and goals of therapy, but is neutral about his/her participation. ? Patient is unable to participate in goal setting and plan of care. Thank you for this referral.  Geovanny Hampton                 Regarding student involvement in patient care:  A student participated in this treatment session. Per CMS Medicare statements and MARIBELL guidelines I certify that the following was true:  1. I was present and directly observed the entire session. 2. I made all skilled judgments and clinical decisions regarding care. 3. I am the practitioner responsible for assessment, treatment, and documentation.

## 2019-10-30 NOTE — DISCHARGE INSTRUCTIONS
Patient Education        Avoiding Triggers With Heart Failure: Care Instructions  Your Care Instructions    Triggers are anything that make your heart failure flare up. A flare-up is also called \"sudden heart failure\" or \"acute heart failure. \" When you have a flare-up, fluid builds up in your lungs, and you have problems breathing. You might need to go to the hospital. By watching for changes in your condition and avoiding triggers, you can prevent heart failure flare-ups. Follow-up care is a key part of your treatment and safety. Be sure to make and go to all appointments, and call your doctor if you are having problems. It's also a good idea to know your test results and keep a list of the medicines you take. How can you care for yourself at home? Watch for changes in your weight and condition  · Weigh yourself without clothing at the same time each day. Record your weight. Call your doctor if you have sudden weight gain, such as more than 2 to 3 pounds in a day or 5 pounds in a week. (Your doctor may suggest a different range of weight gain.) A sudden weight gain may mean that your heart failure is getting worse. · Keep a daily record of your symptoms. Write down any changes in how you feel, such as new shortness of breath, cough, or problems eating. Also record if your ankles are more swollen than usual and if you feel more tired than usual. Note anything that you ate or did that could have triggered these changes. Limit sodium  Sodium causes your body to hold on to extra water. This may cause your heart failure symptoms to get worse. People get most of their sodium from processed foods. Fast food and restaurant meals also tend to be very high in sodium. · Your doctor may suggest that you limit sodium to 2,000 milligrams (mg) a day or less. That is less than 1 teaspoon of salt a day, including all the salt you eat in cooking or in packaged foods. · Read food labels on cans and food packages.  They tell you how much sodium you get in one serving. Check the serving size. If you eat more than one serving, you are getting more sodium. · Be aware that sodium can come in forms other than salt, including monosodium glutamate (MSG), sodium citrate, and sodium bicarbonate (baking soda). MSG is often added to Asian food. You can sometimes ask for food without MSG or salt. · Slowly reducing salt will help you adjust to the taste. Take the salt shaker off the table. · Flavor your food with garlic, lemon juice, onion, vinegar, herbs, and spices instead of salt. Do not use soy sauce, steak sauce, onion salt, garlic salt, mustard, or ketchup on your food, unless it is labeled \"low-sodium\" or \"low-salt. \"  · Make your own salad dressings, sauces, and ketchup without adding salt. · Use fresh or frozen ingredients, instead of canned ones, whenever you can. Choose low-sodium canned goods. · Eat less processed food and food from restaurants, including fast food. Exercise as directed  Moderate, regular exercise is very good for your heart. It improves your blood flow and helps control your weight. But too much exercise can stress your heart and cause a heart failure flare-up. · Check with your doctor before you start an exercise program.  · Walking is an easy way to get exercise. Start out slowly. Gradually increase the length and pace of your walk. Swimming, riding a bike, and using a treadmill are also good forms of exercise. · When you exercise, watch for signs that your heart is working too hard. You are pushing yourself too hard if you cannot talk while you are exercising. If you become short of breath or dizzy or have chest pain, stop, sit down, and rest.  · Do not exercise when you do not feel well. Take medicines correctly  · Take your medicines exactly as prescribed. Call your doctor if you think you are having a problem with your medicine. · Make a list of all the medicines you take.  Include those prescribed to you by other doctors and any over-the-counter medicines, vitamins, or supplements you take. Take this list with you when you go to any doctor. · Take your medicines at the same time every day. It may help you to post a list of all the medicines you take every day and what time of day you take them. · Make taking your medicine as simple as you can. Plan times to take your medicines when you are doing other things, such as eating a meal or getting ready for bed. This will make it easier to remember to take your medicines. · Get organized. Use helpful tools, such as daily or weekly pill containers. When should you call for help? Call 911 if you have symptoms of sudden heart failure such as:    · You have severe trouble breathing.     · You cough up pink, foamy mucus.     · You have a new irregular or rapid heartbeat.    Call your doctor now or seek immediate medical care if:    · You have new or increased shortness of breath.     · You are dizzy or lightheaded, or you feel like you may faint.     · You have sudden weight gain, such as more than 2 to 3 pounds in a day or 5 pounds in a week. (Your doctor may suggest a different range of weight gain.)     · You have increased swelling in your legs, ankles, or feet.     · You are suddenly so tired or weak that you cannot do your usual activities.    Watch closely for changes in your health, and be sure to contact your doctor if you develop new symptoms. Where can you learn more? Go to http://rj-kasey.info/. Enter N449 in the search box to learn more about \"Avoiding Triggers With Heart Failure: Care Instructions. \"  Current as of: April 9, 2019  Content Version: 12.2  © 6583-1938 [employer]. Care instructions adapted under license by [employer] (which disclaims liability or warranty for this information).  If you have questions about a medical condition or this instruction, always ask your healthcare professional. [employer] disclaims any warranty or liability for your use of this information.

## 2019-10-31 LAB
ALBUMIN SERPL-MCNC: 3 G/DL (ref 3.5–5)
ANION GAP SERPL CALC-SCNC: 8 MMOL/L (ref 5–15)
BUN SERPL-MCNC: 46 MG/DL (ref 6–20)
BUN/CREAT SERPL: 18 (ref 12–20)
CALCIUM SERPL-MCNC: 8.4 MG/DL (ref 8.5–10.1)
CHLORIDE SERPL-SCNC: 106 MMOL/L (ref 97–108)
CO2 SERPL-SCNC: 28 MMOL/L (ref 21–32)
CREAT SERPL-MCNC: 2.5 MG/DL (ref 0.7–1.3)
CREAT UR-MCNC: 92.9 MG/DL
EOSINOPHIL #/AREA URNS HPF: NEGATIVE /[HPF]
ERYTHROCYTE [DISTWIDTH] IN BLOOD BY AUTOMATED COUNT: 12.8 % (ref 11.5–14.5)
FLUID CULTURE, SPNG2: NORMAL
GLUCOSE SERPL-MCNC: 95 MG/DL (ref 65–100)
HCT VFR BLD AUTO: 31.6 % (ref 36.6–50.3)
HGB BLD-MCNC: 10.1 G/DL (ref 12.1–17)
L PNEUMO1 AG UR QL IA: NEGATIVE
MAGNESIUM SERPL-MCNC: 1.9 MG/DL (ref 1.6–2.4)
MCH RBC QN AUTO: 32.2 PG (ref 26–34)
MCHC RBC AUTO-ENTMCNC: 32 G/DL (ref 30–36.5)
MCV RBC AUTO: 100.6 FL (ref 80–99)
NRBC # BLD: 0 K/UL (ref 0–0.01)
NRBC BLD-RTO: 0 PER 100 WBC
ORGANISM ID, SPNG3: NORMAL
PHOSPHATE SERPL-MCNC: 4.3 MG/DL (ref 2.6–4.7)
PLATELET # BLD AUTO: 129 K/UL (ref 150–400)
PLEASE NOTE, SPNG4: NORMAL
PMV BLD AUTO: 11.2 FL (ref 8.9–12.9)
POTASSIUM SERPL-SCNC: 3.7 MMOL/L (ref 3.5–5.1)
RBC # BLD AUTO: 3.14 M/UL (ref 4.1–5.7)
S PNEUM AG SPEC QL LA: NEGATIVE
SODIUM SERPL-SCNC: 142 MMOL/L (ref 136–145)
SODIUM UR-SCNC: 80 MMOL/L
SPECIMEN SOURCE: NORMAL
SPECIMEN SOURCE: NORMAL
SPECIMEN, SPNG1: NORMAL
WBC # BLD AUTO: 4.8 K/UL (ref 4.1–11.1)

## 2019-10-31 PROCEDURE — 74011250637 HC RX REV CODE- 250/637: Performed by: INTERNAL MEDICINE

## 2019-10-31 PROCEDURE — 84300 ASSAY OF URINE SODIUM: CPT

## 2019-10-31 PROCEDURE — 36415 COLL VENOUS BLD VENIPUNCTURE: CPT

## 2019-10-31 PROCEDURE — 82570 ASSAY OF URINE CREATININE: CPT

## 2019-10-31 PROCEDURE — 97110 THERAPEUTIC EXERCISES: CPT

## 2019-10-31 PROCEDURE — 97530 THERAPEUTIC ACTIVITIES: CPT

## 2019-10-31 PROCEDURE — 65660000000 HC RM CCU STEPDOWN

## 2019-10-31 PROCEDURE — 74011000258 HC RX REV CODE- 258: Performed by: INTERNAL MEDICINE

## 2019-10-31 PROCEDURE — 80069 RENAL FUNCTION PANEL: CPT

## 2019-10-31 PROCEDURE — 83735 ASSAY OF MAGNESIUM: CPT

## 2019-10-31 PROCEDURE — 87205 SMEAR GRAM STAIN: CPT

## 2019-10-31 PROCEDURE — 97535 SELF CARE MNGMENT TRAINING: CPT

## 2019-10-31 PROCEDURE — 74011250636 HC RX REV CODE- 250/636: Performed by: INTERNAL MEDICINE

## 2019-10-31 PROCEDURE — 85027 COMPLETE CBC AUTOMATED: CPT

## 2019-10-31 PROCEDURE — 92526 ORAL FUNCTION THERAPY: CPT

## 2019-10-31 PROCEDURE — 97165 OT EVAL LOW COMPLEX 30 MIN: CPT

## 2019-10-31 RX ADMIN — SIMVASTATIN 20 MG: 20 TABLET, FILM COATED ORAL at 21:41

## 2019-10-31 RX ADMIN — MIRTAZAPINE 15 MG: 15 TABLET, FILM COATED ORAL at 21:41

## 2019-10-31 RX ADMIN — CARVEDILOL 3.12 MG: 3.12 TABLET, FILM COATED ORAL at 10:20

## 2019-10-31 RX ADMIN — MIRABEGRON 50 MG: 25 TABLET, FILM COATED, EXTENDED RELEASE ORAL at 21:42

## 2019-10-31 RX ADMIN — DORZOLAMIDE HYDROCHLORIDE 1 DROP: 20 SOLUTION/ DROPS OPHTHALMIC at 16:43

## 2019-10-31 RX ADMIN — Medication 10 ML: at 21:46

## 2019-10-31 RX ADMIN — Medication 10 ML: at 05:59

## 2019-10-31 RX ADMIN — PIPERACILLIN AND TAZOBACTAM 3.38 G: 3; .375 INJECTION, POWDER, LYOPHILIZED, FOR SOLUTION INTRAVENOUS at 12:37

## 2019-10-31 RX ADMIN — DOXYCYCLINE 100 MG: 100 INJECTION, POWDER, LYOPHILIZED, FOR SOLUTION INTRAVENOUS at 21:41

## 2019-10-31 RX ADMIN — DORZOLAMIDE HYDROCHLORIDE 1 DROP: 20 SOLUTION/ DROPS OPHTHALMIC at 21:49

## 2019-10-31 RX ADMIN — APIXABAN 2.5 MG: 2.5 TABLET, FILM COATED ORAL at 21:41

## 2019-10-31 RX ADMIN — THERA TABS 1 TABLET: TAB at 10:20

## 2019-10-31 RX ADMIN — LORATADINE 10 MG: 10 TABLET ORAL at 21:41

## 2019-10-31 RX ADMIN — DOXYCYCLINE 100 MG: 100 INJECTION, POWDER, LYOPHILIZED, FOR SOLUTION INTRAVENOUS at 10:21

## 2019-10-31 RX ADMIN — DORZOLAMIDE HYDROCHLORIDE 1 DROP: 20 SOLUTION/ DROPS OPHTHALMIC at 10:20

## 2019-10-31 RX ADMIN — PIPERACILLIN AND TAZOBACTAM 3.38 G: 3; .375 INJECTION, POWDER, LYOPHILIZED, FOR SOLUTION INTRAVENOUS at 00:21

## 2019-10-31 RX ADMIN — COLCHICINE 0.6 MG: 0.6 TABLET, FILM COATED ORAL at 10:21

## 2019-10-31 RX ADMIN — APIXABAN 2.5 MG: 2.5 TABLET, FILM COATED ORAL at 10:21

## 2019-10-31 RX ADMIN — VITAMIN D, TAB 1000IU (100/BT) 1 TABLET: 25 TAB at 10:20

## 2019-10-31 RX ADMIN — CARVEDILOL 3.12 MG: 3.12 TABLET, FILM COATED ORAL at 16:43

## 2019-10-31 RX ADMIN — SERTRALINE HYDROCHLORIDE 50 MG: 50 TABLET ORAL at 21:41

## 2019-10-31 NOTE — PROGRESS NOTES
Problem: Self Care Deficits Care Plan (Adult)  Goal: *Acute Goals and Plan of Care (Insert Text)  Description    FUNCTIONAL STATUS PRIOR TO ADMISSION: The patient was functional at the wheelchair level and required moderate assistance for transfers to the chair. HOME SUPPORT: The patient lived at long term care facility and received assist as needed. Occupational Therapy Goals  Initiated 10/31/2019  1. Patient will perform upper body dressing with minimal assistance/contact guard assist within 7 day(s). 2.  Patient will perform self-feeding, using built up handles to utensils, with supervision/set-up within 7 day(s). 4.  Patient will perform toilet transfers with moderate assistance  within 7 day(s). 5.  Patient will perform all aspects of toileting with minimum assistance  within 7 day(s). 6.  Patient will participate in upper extremity therapeutic exercise/activities with supervision/set-up for 5 minutes within 7 day(s). Outcome: Progressing Towards Goal   OCCUPATIONAL THERAPY EVALUATION  Patient: Jose Miguel Marie (47 y.o. male)  Date: 10/31/2019  Primary Diagnosis: Acute respiratory failure with hypoxia and hypercapnia (HCC) [J96.01, J96.02]        Precautions:   Fall, DNR    ASSESSMENT  Based on the objective data described below, the patient presents with hospital admission secondary to acute respiratory failure. Patient received supine in bed, family present. Patient agreeable to activity. Per family patient requires assist to wheelchair, assist for ADL tasks. Patient today requires mod assist x 2 for bed mobility, and min/mod asisst x 2 for transfer to chair. Once seated EOB patient noted with difficulty maintaining balance but improved when seated in chair, without back support. Patient set up for meal and he expresses extreme interest in feeding. Difficulty managing utensils secondary to decreased coordination/strength.   OT provided built up handles and patient able to manage feeding. Patient high fall risk, with decreased strength and functional mobility. Patient agreeable to continued therapy as needed. Current Level of Function Impacting Discharge (ADLs/self-care): mod assist x 2 for transfers    Functional Outcome Measure: The patient scored 25/100 on the Barthel Index outcome measure. Other factors to consider for discharge: long term care resident      Patient will benefit from skilled therapy intervention to address the above noted impairments. PLAN :  Recommendations and Planned Interventions: self care training, functional mobility training, therapeutic exercise, balance training, endurance activities and patient education    Frequency/Duration: Patient will be followed by occupational therapy 2 times a week to address goals. Recommendation for discharge: (in order for the patient to meet his/her long term goals)  Therapy up to 5 days/week in SNF setting, patient has been receiving therapy at home facility    This discharge recommendation:  Has not yet been discussed the attending provider and/or case management    IF patient discharges home will need the following DME: none        SUBJECTIVE:   Patient stated I feel ok today.     OBJECTIVE DATA SUMMARY:   HISTORY:   Past Medical History:   Diagnosis Date    Allergic rhinitis     Arthritis     Atrial fibrillation (HCC)     Benign prostatic hyperplasia     CAD (coronary artery disease)     Cerebral infarction ([facility] [address]     Chronic kidney disease     Chronic pain     Dry eye syndrome     Dysphagia     Glaucoma     Gout     Hearing loss, bilateral     Hemiplegia (HCC)     left side    Hyperlipemia     Hypertension     Major depressive disorder     Osteoarthritis     Overactive bladder     Pacemaker     Polyarthritis     Urinary incontinence     Vitamin D deficiency    History reviewed. No pertinent surgical history.     Expanded or extensive additional review of patient history:     Home Situation  Home Environment: Long term care  # Steps to Enter: 0  One/Two Story Residence: One story  Living Alone: No  Support Systems: Child(isael)  Patient Expects to be Discharged to[de-identified] Skilled nursing facility  Current DME Used/Available at Home: Wheelchair, Walker, rolling  Tub or Shower Type: Shower    Hand dominance: Right    EXAMINATION OF PERFORMANCE DEFICITS:  Cognitive/Behavioral Status:  Neurologic State: Alert  Orientation Level: Oriented X4  Cognition: Follows commands; Appropriate decision making  Perception: Appears intact  Perseveration: No perseveration noted  Safety/Judgement: Awareness of environment; Insight into deficits    Skin: intact as seen    Edema: none noted     Hearing: Auditory  Auditory Impairment: None    Vision/Perceptual:                                     Range of Motion:  AROM: Generally decreased, functional                         Strength:  Strength: Generally decreased, functional                Coordination:  Coordination: Generally decreased, functional            Tone & Sensation:  Tone: Normal  Sensation: Intact                      Balance:  Sitting: Intact; High guard(air mattress, better in chair)  Standing: Impaired;Pull to stand; With support  Standing - Static: Constant support; Fair  Standing - Dynamic : Constant support; Fair    Functional Mobility and Transfers for ADLs:  Bed Mobility:  Rolling: Minimum assistance  Supine to Sit: Moderate assistance;Assist x2(air mattress- difficutly managing)  Scooting: Minimum assistance;Assist x2    Transfers:  Sit to Stand: Minimum assistance; Moderate assistance;Assist x2  Stand to Sit: Minimum assistance;Assist x2  Bed to Chair: Minimum assistance; Moderate assistance;Assist x2  Toilet Transfer :  Moderate assistance;Assist x2(BSC )  Assistive Device : Walker, rolling    ADL Assessment:  Feeding: Stand-by assistance(difficutly with utensils)    Oral Facial Hygiene/Grooming: Minimum assistance    Bathing: Maximum assistance    Upper Body Dressing: Minimum assistance    Lower Body Dressing: Moderate assistance    Toileting: Moderate assistance                ADL Intervention and task modifications:                                     Cognitive Retraining  Safety/Judgement: Awareness of environment; Insight into deficits    Therapeutic Exercise:  Functional Measure:  Barthel Index:    Bathin  Bladder: 0  Bowels: 5  Groomin  Dressin  Feedin  Mobility: 0  Stairs: 0  Toilet Use: 5  Transfer (Bed to Chair and Back): 5  Total: 25/100        The Barthel ADL Index: Guidelines  1. The index should be used as a record of what a patient does, not as a record of what a patient could do. 2. The main aim is to establish degree of independence from any help, physical or verbal, however minor and for whatever reason. 3. The need for supervision renders the patient not independent. 4. A patient's performance should be established using the best available evidence. Asking the patient, friends/relatives and nurses are the usual sources, but direct observation and common sense are also important. However direct testing is not needed. 5. Usually the patient's performance over the preceding 24-48 hours is important, but occasionally longer periods will be relevant. 6. Middle categories imply that the patient supplies over 50 per cent of the effort. 7. Use of aids to be independent is allowed. Leela Philip., Barthel, D.W. (4671). Functional evaluation: the Barthel Index. [facility] (14)2. RACHNA Rodriguez, Chandu Vela., [facility] [place], [address] [place] [address] (). Measuring the change indisability after inpatient rehabilitation; comparison of the responsiveness of the Barthel Index and Functional [place] Measure. Journal of Neurology, Neurosurgery, and Psychiatry, 66(4), 221-317. Charito Mendez, N.J.A, Alis Wilhelm,  W.J.M, & Bella Woodson, M.A. (2004.) Assessment of post-stroke quality of life in cost-effectiveness studies:  The usefulness of the Barthel Index and the EuroQoL-5D. Quality of Life Research, 15, 499-46         Occupational Therapy Evaluation Charge Determination   History Examination Decision-Making   LOW Complexity : Brief history review  LOW Complexity : 1-3 performance deficits relating to physical, cognitive , or psychosocial skils that result in activity limitations and / or participation restrictions  LOW Complexity : No comorbidities that affect functional and no verbal or physical assistance needed to complete eval tasks       Based on the above components, the patient evaluation is determined to be of the following complexity level: LOW   Pain Rating:      Activity Tolerance:   Fair and requires rest breaks  Please refer to the flowsheet for vital signs taken during this treatment. After treatment patient left in no apparent distress:    Sitting in chair, Call bell within reach, Bed / chair alarm activated and Caregiver / family present    COMMUNICATION/EDUCATION:   The patients plan of care was discussed with: Physical Therapist and Registered Nurse. Home safety education was provided and the patient/caregiver indicated understanding., Patient/family have participated as able in goal setting and plan of care. and Patient/family agree to work toward stated goals and plan of care. This patients plan of care is appropriate for delegation to [facility].     Thank you for this referral.  Ton Gilman OTR/L  Time Calculation: 28 mins

## 2019-10-31 NOTE — PROGRESS NOTES
Bedside and Verbal shift change report given to Fanta Jacobs RN (oncoming nurse) by Shazia Whyte RN (offgoing nurse). Report included the following information SBAR, Kardex, Procedure Summary, Intake/Output, MAR, Accordion and Recent Results.

## 2019-10-31 NOTE — PROGRESS NOTES
CM Note:  A referral was sent in [facility] for pt to return to LTC, possibly tomorrow.   MATEUS Thurston

## 2019-10-31 NOTE — PROGRESS NOTES
CMS Note  10/31/2019    Patient received their 2nd IM letter. Patient was not able to sign the letter. Patient was given a copy for their record.   Fanta Pimentel CMS

## 2019-10-31 NOTE — WOUND CARE
Wound care consult: Follow up visit for skin and wound assessment, alert, no distress. 
  
Assessment All skin folds and bony prominences assessed, turned with staff assistance. Condom cath in place. Skin is thin and fragile. Sacral area - resolving large area of blanching redness, skin intact- pink and moist from incontinence. Heels intact, red and blanching- skin is dry, off loading boots in place.  
  
Treatment Incontinent care given - zinc applied. Repositioned in bed Heels floated 
  
Recommendations/Plan Low air loss surface ordered - placed on surface by nursing Turn, reposition every 2 hours as tolerated, float heels, place in off loading boots Incontinent care with comfort shields. Apply Zinc to all open areas, moisture barrier as needed. Will follow, reconsult as needed.  
Yoly Viveros RN CWON

## 2019-10-31 NOTE — PROGRESS NOTES
[facility] Dosing Services: 10/31/19    Pharmacist Renal Dosing Progress Note for Zosyn     The following medication: Zosyn was automatically dose-adjusted per [facility] P&T Committee Protocol, with respect to renal function. Consult provided for this   80 y.o. , male , for the indication of possible pneumonia. Pt Weight:   Wt Readings from Last 1 Encounters:   10/31/19 72.4 kg (159 lb 9.8 oz)     Dosage changed to:  Zosyn 3.375g IV q12h over 4 hours    Previous Regimen Zosyn 3.375g IV q8h over 4 hours   Serum Creatinine Lab Results   Component Value Date/Time    Creatinine 2.50 (H) 10/31/2019 04:44 AM         Creatinine Clearance Estimated Creatinine Clearance: 16.7 mL/min (A) (based on SCr of 2.5 mg/dL (H)). BUN Lab Results   Component Value Date/Time    BUN 46 (H) 10/31/2019 04:44 AM           Pharmacy to continue to monitor patient daily. Will make dosage adjustments based upon changing renal function. Signed Franki Rodriguez.  Contact information:  [phone]

## 2019-10-31 NOTE — PROGRESS NOTES
Name: Livan Monahan: [address]   : 1923 Admit Date: 10/29/2019   Phone: [phone]  Room: [facility]/   PCP: Jose Alberto Quintanilla MD  MRN: [id number]   Date: 10/31/2019  Code: DNR          Chart and notes reviewed. Data reviewed. I review the patient's current medications in the medical record at each encounter. I have evaluated and examined the patient. History of Present Illness:  Mr. Lorin Leal is a 81 yo gentleman with a history of prior CVA, afib s/p PPM, CAD, and CKD who is admitted with acute hypoxic and hypercapnic respiratory failure. Patient is unable to provide any history and history was obtained via chart review and from other medical providers. He has been short of breath for about a week. Noted to have pedal edema on exam and CXR with effusion and pulmonary edema. Placed on BiPAP in the ED for increased work of breathing and now appears comfortable. Labs: WBC 6.1, cr 1.75, proBNP 3677, troponin <0.05    Images:  CXR with bilateral effusions and pulmonary edema    Interval history  Afebrile  Sats 97% on 3L  Creat 2.50 - worse  10/29 repeat ABG 7.39/45/69/27 on 3L  RVP neg  Blood cx no growth x 2 days  I/O: not accurately documented; no intake documented yesterday. 2175 ml UOP  ECHO: EF 98-36%; grade 1 diastolic dysfunction; mild to mod AVR    ROS: Sleeping but arouses to name. Has no complaints. Denies SOB. Denies fever or chills. Denies CP. Denies abd pain. LE/feet swelling better.       Past Medical History:   Diagnosis Date    Allergic rhinitis     Arthritis     Atrial fibrillation (HCC)     Benign prostatic hyperplasia     CAD (coronary artery disease)     Cerebral infarction ([facility] [address]     Chronic kidney disease     Chronic pain     Dry eye syndrome     Dysphagia     Glaucoma     Gout     Hearing loss, bilateral     Hemiplegia (HCC)     left side    Hyperlipemia     Hypertension     Major depressive disorder     Osteoarthritis     Overactive bladder     Pacemaker     Polyarthritis     Urinary incontinence     Vitamin D deficiency        History reviewed. No pertinent surgical history. History reviewed. No pertinent family history.     Social History     Tobacco Use    Smoking status: Not on file   Substance Use Topics    Alcohol use: Not on file       No Known Allergies    Current Facility-Administered Medications   Medication Dose Route Frequency    piperacillin-tazobactam (ZOSYN) 3.375 g in 0.9% sodium chloride (MBP/ADV) 100 mL  3.375 g IntraVENous Q12H    furosemide (LASIX) injection 40 mg  40 mg IntraVENous DAILY    sodium chloride (NS) flush 5-40 mL  5-40 mL IntraVENous Q8H    sodium chloride (NS) flush 5-40 mL  5-40 mL IntraVENous PRN    sodium chloride (NS) flush 5-40 mL  5-40 mL IntraVENous Q8H    sodium chloride (NS) flush 5-40 mL  5-40 mL IntraVENous PRN    acetaminophen (TYLENOL) tablet 650 mg  650 mg Oral Q6H PRN    naloxone (NARCAN) injection 0.4 mg  0.4 mg IntraVENous PRN    albuterol-ipratropium (DUO-NEB) 2.5 MG-0.5 MG/3 ML  3 mL Nebulization Q6H PRN    doxycycline (VIBRAMYCIN) 100 mg in 0.9% sodium chloride (MBP/ADV) 100 mL  100 mg IntraVENous Q12H    apixaban (ELIQUIS) tablet 2.5 mg  2.5 mg Oral BID    carboxymethylcellulose sodium (CELLUVISC) 1 % ophthalmic solution 2 Drop  2 Drop Both Eyes DAILY PRN    carvedilol (COREG) tablet 3.125 mg  3.125 mg Oral BID WITH MEALS    cholecalciferol (VITAMIN D3) (1000 Units /25 mcg) tablet 1 Tab  1,000 Units Oral DAILY    colchicine tablet 0.6 mg  0.6 mg Oral BID PRN    loratadine (CLARITIN) tablet 10 mg  10 mg Oral DAILY    mirabegron ER (MYRBETRIQ) tablet 50 mg  50 mg Oral DAILY    mirtazapine (REMERON) tablet 15 mg  15 mg Oral QHS    sertraline (ZOLOFT) tablet 50 mg  50 mg Oral DAILY    simvastatin (ZOCOR) tablet 20 mg  20 mg Oral QHS    therapeutic multivitamin (THERAGRAN) tablet 1 Tab  1 Tab Oral DAILY    dorzolamide (TRUSOPT) 2 % ophthalmic solution 1 Drop  1 Drop Both Eyes TID         REVIEW OF SYSTEMS   12 point ROS negative except as stated in the HPI. Physical Exam:   Visit Vitals  /63 (BP 1 Location: Left arm, BP Patient Position: At rest)   Pulse 62   Temp 97.5 °F (36.4 °C)   Resp 18   Ht 5' 8\" (1.727 m)   Wt 72.4 kg (159 lb 9.8 oz)   SpO2 97%   BMI 24.27 kg/m²       General:  Asleep, NAD   Head:  Normocephalic, without obvious abnormality, atraumatic. Eyes:  Conjunctivae/corneas clear. Nose: Nares normal. Septum midline. Mucosa normal.    Throat: Lips, mucosa, and tongue normal.    Neck: Supple, symmetrical, trachea midline, no adenopathy. Lungs:   Clear to auscultation bilaterally. Resp non labored. Chest wall:  No tenderness or deformity. Heart:  Regular rate and rhythm, S1, S2 normal, no murmur, click, rub or gallop. Abdomen:   Soft, non-tender. Bowel sounds normal.   Extremities: Extremities normal, atraumatic, no cyanosis; trace LE edema   Pulses: 2+ and symmetric all extremities. Skin: Skin color, texture, turgor normal. No rashes or lesions   Lymph nodes: Cervical, supraclavicular nodes normal.   Neurologic: Grossly nonfocal       Lab Results   Component Value Date/Time    Sodium 142 10/31/2019 04:44 AM    Potassium 3.7 10/31/2019 04:44 AM    Chloride 106 10/31/2019 04:44 AM    CO2 28 10/31/2019 04:44 AM    BUN 46 (H) 10/31/2019 04:44 AM    Creatinine 2.50 (H) 10/31/2019 04:44 AM    Glucose 95 10/31/2019 04:44 AM    Calcium 8.4 (L) 10/31/2019 04:44 AM    Magnesium 1.9 10/31/2019 04:44 AM    Phosphorus 4.3 10/31/2019 04:44 AM       Lab Results   Component Value Date/Time    WBC 4.8 10/31/2019 04:44 AM    HGB 10.1 (L) 10/31/2019 04:44 AM    PLATELET 995 (L) 02/38/1946 04:44 AM    .6 (H) 10/31/2019 04:44 AM       Lab Results   Component Value Date/Time    AST (SGOT) 20 10/30/2019 12:40 AM    Alk.  phosphatase 76 10/30/2019 12:40 AM    Protein, total 6.8 10/30/2019 12:40 AM    Albumin 3.0 (L) 10/31/2019 04:44 AM Globulin 3.6 10/30/2019 12:40 AM       No results found for: IRON, FE, TIBC, IBCT, PSAT, FERR    No results found for: SR, CRP, DARIN, ANAIGG, RA, RPR, RPRT, VDRLT, VDRLS, TSH, TSHEXT, TSHEXT     No results found for: PH, PHI, PCO2, PCO2I, PO2, PO2I, HCO3, HCO3I, FIO2, FIO2I    Lab Results   Component Value Date/Time    Troponin-I, Qt. <0.05 10/29/2019 07:29 AM        Lab Results   Component Value Date/Time    Culture result: NO GROWTH 2 DAYS 10/29/2019 07:29 AM    Culture result: NO GROWTH 2 DAYS 10/29/2019 07:29 AM       No results found for: TOXA1, RPR, HBCM, HBSAG, HAAB, HCAB1, HAAT, G6PD, CRYAC, HIVGT, HIVR, HIV1, HIV12, HIVPC, HIVRPI    No results found for: VANCT, CPK    Lab Results   Component Value Date/Time    Color YELLOW/STRAW 10/29/2019 08:00 AM    Appearance CLEAR 10/29/2019 08:00 AM    pH (UA) 5.0 10/29/2019 08:00 AM    Protein 30 (A) 10/29/2019 08:00 AM    Glucose NEGATIVE  10/29/2019 08:00 AM    Ketone NEGATIVE  10/29/2019 08:00 AM    Bilirubin NEGATIVE  10/29/2019 08:00 AM    Blood TRACE (A) 10/29/2019 08:00 AM    Urobilinogen 0.2 10/29/2019 08:00 AM    Nitrites NEGATIVE  10/29/2019 08:00 AM    Leukocyte Esterase NEGATIVE  10/29/2019 08:00 AM    WBC 0-4 10/29/2019 08:00 AM    RBC 0-5 10/29/2019 08:00 AM    Bacteria NEGATIVE  10/29/2019 08:00 AM       IMPRESSION  · Acute hypoxic and hypercapnic respiratory failure  · Pulmonary edema  · DELVIS/CKD  · Afib, s/p PPM    PLAN  · Wean off O2 as able for goal sats >90%  · Diuresis per Cardiology/primary team; holding further Lasix for now given worsening creat  · Empiric abx - Doxy/Zosyn; f/u cultures and pna work up  · Speech following: mild/mod dysphagia. Continuing current cardiac diet with thins. Could consider MBS in the future.   · Eliquis  · [address] [place]

## 2019-10-31 NOTE — PROGRESS NOTES
Bedside and Verbal shift change report given to Bola May RN (oncoming nurse) by Leah Grimm RN (offgoing nurse). Report included the following information SBAR, Kardex, Intake/Output, MAR and Accordion.

## 2019-10-31 NOTE — PROGRESS NOTES
Problem: Mobility Impaired (Adult and Pediatric)  Goal: *Acute Goals and Plan of Care (Insert Text)  Description  FUNCTIONAL STATUS PRIOR TO ADMISSION: Patient required moderate assistance for basic and instrumental ADLs. Feed self per [facility] with modified utensils. The patient was functional at the wheelchair level propelling with BLE's and was Mod A for transfers to the chair. HOME SUPPORT PRIOR TO ADMISSION: The patient lived in [address] [place] [address] at [facility]. Physical Therapy Goals  Initiated 10/30/2019  1. Patient will move from supine to sit and sit to supine  in bed with minimal assistance/contact guard assist within 7 day(s). 2.  Patient will transfer from bed to chair and chair to bed with minimal assistance/contact guard assist using the least restrictive device within 7 day(s). 3.  Patient will perform sit to stand with minimal assistance/contact guard assist within 7 day(s). 4.  Patient will ambulate with moderate assistance  for 50 feet with the least restrictive device within 7 day(s). Outcome: Progressing Towards Goal   PHYSICAL THERAPY TREATMENT  Patient: Yvette Quick (77 y.o. male)  Date: 10/31/2019  Diagnosis: Acute respiratory failure with hypoxia and hypercapnia (HCC) [J96.01, J96.02] <principal problem not specified>       Precautions: Fall, DNR  Chart, physical therapy assessment, plan of care and goals were reviewed. ASSESSMENT  Patient continues with skilled PT services and is progressing towards goals. Tolerated increased activity and exercises well while on RA. O2 saturations 95% on RA post-activity. Pt requiring A x 2 for bed mobility d/t air mattress surface. Improved standing tolerance and posture. Transferred to chair with Mod A x 1 and performed seated exercises prior to short gait trial in room. Fatigues quickly with gait and requires chair follow. Would benefit from continued skilled therapy upon return to LTC facility.      Current Level of Function Impacting Discharge (mobility/balance): fatiguing quickly A x 2 for bed mobility    Other factors to consider for discharge: None-LTC         PLAN :  Patient continues to benefit from skilled intervention to address the above impairments. Continue treatment per established plan of care. to address goals. Recommendation for discharge: (in order for the patient to meet his/her long term goals)  Therapy up to 5 days/week in SNF setting    This discharge recommendation:  Has been made in collaboration with the attending provider and/or case management    IF patient discharges home will need the following DME: patient owns DME required for discharge       SUBJECTIVE:   Patient stated I love therapy.     OBJECTIVE DATA SUMMARY:   Critical Behavior:  Neurologic State: Alert  Orientation Level: Oriented X4  Cognition: Follows commands, Appropriate decision making  Safety/Judgement: Awareness of environment, Insight into deficits  Functional Mobility Training:  Bed Mobility:  Rolling: Minimum assistance  Supine to Sit: Moderate assistance     Scooting: Minimum assistance        Transfers:  Sit to Stand: Moderate assistance  Stand to Sit: Minimum assistance        Bed to Chair: Moderate assistance                    Balance:  Sitting: Intact  Standing: Impaired;Pull to stand  Standing - Static: Fair  Standing - Dynamic : Fair  Ambulation/Gait Training:  Distance (ft): 5 Feet (ft)  Assistive Device: Walker, rolling;Gait belt  Ambulation - Level of Assistance: Moderate assistance                 Base of Support: Narrowed     Speed/Roxie: Shuffled; Slow  Step Length: Right shortened;Left shortened                    Stairs: Therapeutic Exercises: Ankle pumps, heel raise-toe raise in sitting, LAQ , seated marching, bilateral shoulder flexion x10 reps bilaterally    Activity Tolerance:   Good and SpO2 stable on RA  Please refer to the flowsheet for vital signs taken during this treatment.     After treatment patient left in no apparent distress:   Sitting in chair, Call bell within reach, Bed / chair alarm activated and Caregiver / family present    COMMUNICATION/COLLABORATION:   The patients plan of care was discussed with: Registered Nurse    Ruddy Schmitz, PT   Time Calculation: 25 mins

## 2019-10-31 NOTE — PROGRESS NOTES
Cardiology Progress Note                             [address], [place], [address] [facility]                                 Phone [phone]; Fax [phone]        10/31/2019 10:23 AM     Admit Date:           10/29/2019  Admit Diagnosis:  Acute respiratory failure with hypoxia and hypercapnia ([facility] [address] [J96.01, J96.02]  :          1923   MRN:          [id number]   ASSESSMENT/RECOMMENDATION:   Acute hypoxic resp failure: Empiric abx - Doxy/Zosyn; f/u cultures and pna work up  Acute pulm edema: Cr up loops dc'd. Weaned off oxygen  Conduction disease - a-paced, v-sensed  PAF without recurrence. On Eliquis. Cont coreg. DELVIS: renal consult pending. ? Aspirating: Speech evaluating for aspiration, consider MBS. Current diet cardiac with thin liquids   Deconditioned: PT     Very elderly  DNR     Echo shows pEF, no WMA. Mild to mod AR- cannot rule out bicuspid aortic valve              No intake/output data recorded. Last 3 Recorded Weights in this Encounter    10/29/19 1537 10/30/19 0402 10/31/19 0623   Weight: 175 lb (79.4 kg) 172 lb (78 kg) 159 lb 9.8 oz (72.4 kg)         10/29 1901 - 10/31 0700  In: 200 [I.V.:200]  Out: 3204 [Urine:3825]    SUBJECTIVE               Roseline Armor denies pain or SOB.   \" I am ok I guess \"         Current Facility-Administered Medications   Medication Dose Route Frequency    piperacillin-tazobactam (ZOSYN) 3.375 g in 0.9% sodium chloride (MBP/ADV) 100 mL  3.375 g IntraVENous Q12H    sodium chloride (NS) flush 5-40 mL  5-40 mL IntraVENous Q8H    sodium chloride (NS) flush 5-40 mL  5-40 mL IntraVENous PRN    sodium chloride (NS) flush 5-40 mL  5-40 mL IntraVENous Q8H    sodium chloride (NS) flush 5-40 mL  5-40 mL IntraVENous PRN    acetaminophen (TYLENOL) tablet 650 mg  650 mg Oral Q6H PRN    naloxone (NARCAN) injection 0.4 mg  0.4 mg IntraVENous PRN    albuterol-ipratropium (DUO-NEB) 2.5 MG-0.5 MG/3 ML  3 mL Nebulization Q6H PRN    doxycycline (VIBRAMYCIN) 100 mg in 0.9% sodium chloride (MBP/ADV) 100 mL  100 mg IntraVENous Q12H    apixaban (ELIQUIS) tablet 2.5 mg  2.5 mg Oral BID    carboxymethylcellulose sodium (CELLUVISC) 1 % ophthalmic solution 2 Drop  2 Drop Both Eyes DAILY PRN    carvedilol (COREG) tablet 3.125 mg  3.125 mg Oral BID WITH MEALS    cholecalciferol (VITAMIN D3) (1000 Units /25 mcg) tablet 1 Tab  1,000 Units Oral DAILY    colchicine tablet 0.6 mg  0.6 mg Oral BID PRN    loratadine (CLARITIN) tablet 10 mg  10 mg Oral DAILY    mirabegron ER (MYRBETRIQ) tablet 50 mg  50 mg Oral DAILY    mirtazapine (REMERON) tablet 15 mg  15 mg Oral QHS    sertraline (ZOLOFT) tablet 50 mg  50 mg Oral DAILY    simvastatin (ZOCOR) tablet 20 mg  20 mg Oral QHS    therapeutic multivitamin (THERAGRAN) tablet 1 Tab  1 Tab Oral DAILY    dorzolamide (TRUSOPT) 2 % ophthalmic solution 1 Drop  1 Drop Both Eyes TID      OBJECTIVE               Intake/Output Summary (Last 24 hours) at 10/31/2019 4298  Last data filed at 10/31/2019 9190  Gross per 24 hour   Intake 100 ml   Output 2175 ml   Net -2075 ml       Review of Systems - History obtained from the patient AS PER  HPI    Telemetry A sensed V paced     PHYSICAL EXAM        Visit Vitals  /63 (BP 1 Location: Left arm, BP Patient Position: At rest)   Pulse 62   Temp 97.5 °F (36.4 °C)   Resp 18   Ht 5' 8\" (1.727 m)   Wt 159 lb 9.8 oz (72.4 kg)   SpO2 97%   BMI 24.27 kg/m²       Gen: Frail, elderly , in no acute distress  alert and oriented   HEENT:  Pink conjunctivae, Hearing grossly normal.   Oral mucosa dry. Neck: Supple,  No JVD  Resp: Fine exp wheezing L ,   SPO2 97% room air   Card: Regular Rate,  Rhythm, No murmur. GI:          soft, non-tender   Skin: No rashes or ulcers, no bruising.  Left sided ppm scar unremarkable   Neuro:  moving all four extremities, no focal deficit, follows commands appropriately  Psych:  Good insight, oriented to person, place , alert, Nml Affect  LE: +1 pitting LLE/trace RLE  Edema, prevalon boots on.         DATA REVIEW            Laboratory and Imaging have been reviewed by me and are notable for  Recent Labs     10/29/19  0729   TROIQ <0.05     Recent Labs     10/31/19  0444 10/30/19  0040 10/29/19  0729    143 142   K 3.7 4.0 4.7   CO2 28 28 26   BUN 46* 40* 39*   CREA 2.50* 1.99* 1.75*   GLU 95 98 159*   PHOS 4.3 4.0  --    MG 1.9 2.0 2.3   WBC 4.8 5.1 6.1   HGB 10.1* 10.6* 11.4*   HCT 31.6* 33.0* 35.9*   * 146* 165             Antimony [employer]s, NP

## 2019-10-31 NOTE — PROGRESS NOTES
Nutrition Assessment:    RECOMMENDATIONS/INTERVENTION(S):   1. Continue cardiac, 2g Na diet. 2. Add Nepro, Magic Cup, and [employer] all 1x/day to promote adequate po intake. 3. Continue to monitor/encourage po intakes, monitor wt changes, labs. ASSESSMENT:   10/31: Rd consult received for gen nutrition/supplements and MST>2 for unsure wt loss. Pt admitted with acute respiratory failure. PMH includes HTN, CKD, CAD. BMI 24.3, WNL. Spoke with family at bedside. Per family, pt has been eating poor x2 days since admission but ate much better today at lunch. Says she was usually eating only a few bites of meals but today at lunch ate 100% apple juice, apple sauce, pears, and 50% tuna sandwich. Per family, pt was eating normally PT-, 3 meals per day, usually 100% of breakfast, 50% lunch and ~25% dinner. Pt currently on Remeron. Eats Magic Cup with meals at [facility]. Family agreeable to pt trying Magic Cup, [employer], and Nepro with meals. Will add each 1x/day to promote adequate po intake. Pt feeds independently and has adaptive utensils to eat with. Family unsure of wt loss besides that d/t fluid. No N/V. 1+ LE edema noted. Will continue to monitor intakes. Labs- Ca 8.4. Meds- Vit. D3, vibramycin, Remeron, zosyn, MVI. Diet Order: Cardiac  % Eaten:  No data found. Pertinent Medications: [x] Reviewed    Labs: [x] Reviewed    Anthropometrics: Height: 5' 8\" (172.7 cm) Weight: 72.4 kg (159 lb 9.8 oz)    IBW (%IBW):   ( ) UBW (%UBW):   (  %)      BMI: Body mass index is 24.27 kg/m². This BMI is indicative of:   [] Underweight    [x] Normal    [] Overweight    []  Obesity    []  Extreme Obesity (BMI>40)  Estimated Nutrition Needs (Based on): 4099 Kcals/day(1330 x 1.3 AF) , 72 g(0.8-1 g/kg) Protein  Carbohydrate:  At Least 130 g/day  Fluids: 1729 mL/day (1 ml/kcal)    Last BM: 10/29  [x]Active     []Hyperactive  []Hypoactive       [] Absent   BS  Skin:    [] Intact   [] Incision  [] Breakdown   [] DTI   [] Tears/Excoriation/Abrasion  [x]Edema: 1+ LE [x] Other: buttock erythema, heels \"red and blanching\"     Wt Readings from Last 30 Encounters:   10/31/19 72.4 kg (159 lb 9.8 oz)      NUTRITION DIAGNOSES:   Problem:  Inadequate oral intake     Etiology: related to decreased ability to consume sufficient po intake     Signs/Symptoms: as evidenced by family-reported poor po <50% meals x2 days      NUTRITION INTERVENTIONS:  Meals/Snacks: General/healthful diet   Supplements: Commercial supplement              GOAL:   PO intake >50% meals + ONS netx 2-4 days    Cultural, Faith, or Ethnic Dietary Needs: None     EDUCATION & DISCHARGE NEEDS:    [x] None Identified   [] Identified and Education Provided/Documented   [] Identified and Pt declined/was not appropriate      [] Interdisciplinary Care Plan Reviewed/Documented    [x] Discharge Needs:  Heart healthy diet   [] No Nutrition Related Discharge Needs    NUTRITION RISK:   Pt Is At Nutrition Risk  [x]     No Nutrition Risk Identified  []       PT SEEN FOR:    [x]  MD Consult: []Calorie Count      []Diabetic Diet Education        []Diet Education     []Electrolyte Management     [x]General Nutrition Management and Supplements     []Management of Tube Feeding     []TPN Recommendations    [x]  RN Referral:  [x]MST score >=2     []Enteral/Parenteral Nutrition PTA     []Pregnant: Gestational DM or Multigestation                 [] Pressure Ulcer    []  Low BMI      []  Length of Stay       [] Dysphagia Diet         [] Ventilator  []  Follow-up     Previous Recommendations:   [] Implemented          [] Not Implemented          [x] Not Applicable    Previous Goal:   [] Met              [] Progressing Towards Goal              [] Not Progressing Towards Goal   [x] Not Applicable            Yane Irwin, [address] [facility]  Pager [phone]  Phone [phone]

## 2019-10-31 NOTE — CONSULTS
Consult dict    DELVIS on CKD likely due to diuresis  Agree with holding diuretics  UOP is excellent  Anticipate he will stabilize quickly  Avoid nephrotoxins  Not a good candidate for RRT

## 2019-10-31 NOTE — PROGRESS NOTES
Cody Small [facility] [place] [address]  [address] [facility], [place], [address] [place] [address]  [phone]      Medical Progress Note      NAME: Kalina Gamez   :  1923  MRM:  [id number]    Date/Time: 10/31/2019        Assessment / Plan:       Acute respiratory failure with hypoxia and hypercapnia: improving. Required BiPAP on admission and now on 3L NC. Likely due to acute diastolic CHF, with possible PNA. IV diuretics on hold due to DELVIS. TTE preserved EF. Empiric IV abx (Zosyn/doxy). Follow up on sputum culture, PNA workup (Legionella and Strep Pneumo urine ag, Mycoplasma IgM). Viral resp panel negative. Appreciate cardiology and pulmonology input. Evaluated by speech as well, recommending continuing cardiac regular diet, thins. Out of bed, PT/OT      DELVIS on CKD3: likely from diuretics, which are now on hold. Send urine 'lytes. Appreciate nephrology input.        Atrial fibrillation: Pacemaker in place. Continue Coreg, Eliquis.       CAD (coronary artery disease): no CP. Troponin negative. Cont Eliquis, statin, BB.       Hypertension: BP controlled. Monitor on diuretics and Coreg. Holding losartan       Chronic kidney disease: presumed stage 3. Unclear baseline. Follow on diuretics. HOLD ARB       History of cerebral infarction: on Elqiuis, statin       Major depressive disorder: cont SSRI       Total time spent: 25 minutes  Time spent in the care of this patient including reviewing records, discussing with nursing and/or other providers on the treatment team, obtaining history and examining the patient, and discussing treatment plans. Care Plan discussed with: Patient, Nursing Staff and >50% of time spent in counseling and coordination of care    Discussed:  Care Plan and D/C Planning    Prophylaxis:  Eliquis    Disposition:   PT, OT, RN         Subjective:     Chief Complaint:  Follow up respiratory failure    Chart/notes/labs/studies reviewed, patient examined at bedside.     No CP, SOB, f/c. Feeling better overall          Objective:       Vitals:        Last 24hrs VS reviewed since prior progress note. Most recent are:    Visit Vitals  /67 (BP 1 Location: Left arm, BP Patient Position: At rest;Supine)   Pulse 60   Temp 98.1 °F (36.7 °C)   Resp 18   Ht 5' 8\" (1.727 m)   Wt 72.4 kg (159 lb 9.8 oz)   SpO2 97%   BMI 24.27 kg/m²     SpO2 Readings from Last 6 Encounters:   10/31/19 97%    O2 Flow Rate (L/min): 3 l/min       Intake/Output Summary (Last 24 hours) at 10/31/2019 1650  Last data filed at 10/31/2019 1544  Gross per 24 hour   Intake 100 ml   Output 1725 ml   Net -1625 ml          Exam:     Physical Exam:    Gen: elderly, chronically ill-appearing. NAD  HEENT:  No scleral icterus,  hearing intact to voice  Neck:  Supple, without masses. Resp:  No accessory muscle use. No increase WOB. Bibasilar rales improving. Without wheezing or rhonchi. Better air movement today  Card: RRR. Normal S1 and S2 with 2/6 systolic murmur. No rubs, or gallops. Trace bilateral pedal edema. Peripheral pulses in tact. Abd:  Normoactive bowel sounds. Soft, non-tender, non-distended. No rebound, no guarding. No appreciable hepatosplenomegaly   Musc:  No cyanosis or clubbing  Skin:  No rashes or ulcers; turgor intact  Neuro:  Cranial nerves are grossly intact, no focal motor weakness, follows commands appropriately  Psych:  fair insight, normal affect.  Alert, oriented to self and hospital. Answers questions appropriately        Medications Reviewed: (see below)    Lab Data Reviewed: (see below)    ______________________________________________________________________    Medications:     Current Facility-Administered Medications   Medication Dose Route Frequency    piperacillin-tazobactam (ZOSYN) 3.375 g in 0.9% sodium chloride (MBP/ADV) 100 mL  3.375 g IntraVENous Q12H    sodium chloride (NS) flush 5-40 mL  5-40 mL IntraVENous Q8H    sodium chloride (NS) flush 5-40 mL  5-40 mL IntraVENous PRN    sodium chloride (NS) flush 5-40 mL  5-40 mL IntraVENous Q8H    sodium chloride (NS) flush 5-40 mL  5-40 mL IntraVENous PRN    acetaminophen (TYLENOL) tablet 650 mg  650 mg Oral Q6H PRN    naloxone (NARCAN) injection 0.4 mg  0.4 mg IntraVENous PRN    albuterol-ipratropium (DUO-NEB) 2.5 MG-0.5 MG/3 ML  3 mL Nebulization Q6H PRN    doxycycline (VIBRAMYCIN) 100 mg in 0.9% sodium chloride (MBP/ADV) 100 mL  100 mg IntraVENous Q12H    apixaban (ELIQUIS) tablet 2.5 mg  2.5 mg Oral BID    carboxymethylcellulose sodium (CELLUVISC) 1 % ophthalmic solution 2 Drop  2 Drop Both Eyes DAILY PRN    carvedilol (COREG) tablet 3.125 mg  3.125 mg Oral BID WITH MEALS    cholecalciferol (VITAMIN D3) (1000 Units /25 mcg) tablet 1 Tab  1,000 Units Oral DAILY    colchicine tablet 0.6 mg  0.6 mg Oral BID PRN    loratadine (CLARITIN) tablet 10 mg  10 mg Oral DAILY    mirabegron ER (MYRBETRIQ) tablet 50 mg  50 mg Oral DAILY    mirtazapine (REMERON) tablet 15 mg  15 mg Oral QHS    sertraline (ZOLOFT) tablet 50 mg  50 mg Oral DAILY    simvastatin (ZOCOR) tablet 20 mg  20 mg Oral QHS    therapeutic multivitamin (THERAGRAN) tablet 1 Tab  1 Tab Oral DAILY    dorzolamide (TRUSOPT) 2 % ophthalmic solution 1 Drop  1 Drop Both Eyes TID            Lab Review:     Recent Labs     10/31/19  0444 10/30/19  0040 10/29/19  0729   WBC 4.8 5.1 6.1   HGB 10.1* 10.6* 11.4*   HCT 31.6* 33.0* 35.9*   * 146* 165     Recent Labs     10/31/19  0444 10/30/19  0040 10/29/19  0729    143 142   K 3.7 4.0 4.7    107 110*   CO2 28 28 26   GLU 95 98 159*   BUN 46* 40* 39*   CREA 2.50* 1.99* 1.75*   CA 8.4* 8.7 9.1   MG 1.9 2.0 2.3   PHOS 4.3 4.0  --    ALB 3.0* 3.2* 3.6   SGOT  --  20 20   ALT  --  15 15     No components found for: [place]  Recent Labs     10/29/19  2120 10/29/19  0744   PH 7.39 7.27*   PCO2 45 51*   PO2 69* 87   HCO3 27* 23   FIO2  --  40     No results for input(s): INR, INREXT, INREXT in the last 72 hours.   No results found for: SDES  Lab Results   Component Value Date/Time    Culture result: NO GROWTH 2 DAYS 10/29/2019 07:29 AM    Culture result: NO GROWTH 2 DAYS 10/29/2019 07:29 AM              ___________________________________________________    Attending Physician: Keri Archibald MD

## 2019-10-31 NOTE — CONSULTS
[address]     Name:  Mo Dominguez  MR#:  [id number]  :  1923  ACCOUNT #:  [de-identified]  DATE OF SERVICE:  10/31/2019      NEPHROLOGY CONSULTATION    REQUESTING PHYSICIAN:  Dr. Francis Mckenzie. CHIEF COMPLAINT:  Elevated creatinine. HISTORY OF PRESENT ILLNESS:  The patient is a 59-year-old white male, who has a history of chronic kidney disease stage III. He has previously been followed by Dr. Ida Levy but I believe was last seen in 2017. His creatinine was 1.8 at that time. He was admitted to the hospital with shortness of breath and felt to have an element of heart failure and perhaps aspiration. He was treated with diuretics. He has had excellent urine output. His creatinine appear to be stable on admission of 1.75 but has trended up to 2.5 today prompting consultation. His diuretics have been placed on hold. He has hypertension which is stable. He takes an angiotensin receptive blocker at home, but it has been held as well. REVIEW OF SYSTEMS:  CONSTITUTIONAL:  No fevers or chills. GI:  No nausea or vomiting. CARDIOVASCULAR:  No chest pain or shortness of breath. GENITOURINARY:  No difficulty voiding. No hematuria. All other systems reviewed and are negative except as per history of present illness. PAST MEDICAL HISTORY:  Chronic kidney disease stage III, chronic atrial fibrillation, coronary artery disease, history of CVA, hypertension, hyperlipidemia, and gout. FAMILY HISTORY:  No relevant family history of renal disease. SOCIAL HISTORY:  Does not smoke. PHYSICAL EXAMINATION  VITAL SIGNS:  Temperature 98.3, pulse 62, blood pressure 160/68. GENERAL:  Elderly white male, in no acute distress. EYES:  Anicteric. Extraocular movements intact. ENMT:  Mucous membranes are dry. There is no epistaxis. NECK:  Nontender. There  is no mass. HEART:  Irregular. There is no lower extremity edema.   RESPIRATORY:  Lungs are clear anterolaterally with fair effort. He is on nasal cannula oxygen. He is in no respiratory distress. GI:  Abdomen is soft, nontender. Bowel sounds are active. There is no guarding or rebound. SKIN:  Warm with some scattered ecchymoses. There is no rash. MUSCULOSKELETAL:  There is no cyanosis, clubbing. There is no synovitis in the fingers or wrists bilaterally. LABORATORY:  Sodium 142, potassium 3.7, chloride 106, bicarb 28, BUN 46, creatinine 2.5. White count 4.8, hemoglobin 10.1, platelets 050. Urinalysis on admission showed 1+ protein, trace blood, 0-5 rbc's per high-power field. RADIOGRAPHIC STUDIES:  Chest x-ray on admission showed evidence of congestive heart failure. ASSESSMENT:  Acute renal failure superimposed on chronic kidney disease likely due to diuresis. It appears that he has responded well to therapy and treatment has been reasonable. His urine output has been excellent. His diuretics are currently on hold as is his angiotensin receptor blocker. PLAN  1. I agree with current management of the patient. I would avoid nephrotoxins. 2.  I would avoid nephrotoxins and monitor serial labs. 3.  We will order postvoid bladder scan just to rule out any urinary retention which could be a possibility but his excellent urine output tends to argue against that, but not completely ruled out. 4.  We will follow with you to assist in his management. He has not an appropriate candidate for dialysis, but I anticipate that he is likely to stabilize and with medical management.       Dustin Parry MD      [id number]  D:  10/31/2019 11:48  T:  10/31/2019 17:20  JOB #:  [id number]

## 2019-11-01 LAB
ALBUMIN SERPL-MCNC: 3.2 G/DL (ref 3.5–5)
ALBUMIN/GLOB SERPL: 0.9 {RATIO} (ref 1.1–2.2)
ALP SERPL-CCNC: 70 U/L (ref 45–117)
ALT SERPL-CCNC: 15 U/L (ref 12–78)
ANION GAP SERPL CALC-SCNC: 7 MMOL/L (ref 5–15)
AST SERPL-CCNC: 21 U/L (ref 15–37)
BILIRUB SERPL-MCNC: 0.6 MG/DL (ref 0.2–1)
BUN SERPL-MCNC: 48 MG/DL (ref 6–20)
BUN/CREAT SERPL: 21 (ref 12–20)
CALCIUM SERPL-MCNC: 8.7 MG/DL (ref 8.5–10.1)
CHLORIDE SERPL-SCNC: 107 MMOL/L (ref 97–108)
CO2 SERPL-SCNC: 28 MMOL/L (ref 21–32)
CREAT SERPL-MCNC: 2.33 MG/DL (ref 0.7–1.3)
ERYTHROCYTE [DISTWIDTH] IN BLOOD BY AUTOMATED COUNT: 12.8 % (ref 11.5–14.5)
GLOBULIN SER CALC-MCNC: 3.6 G/DL (ref 2–4)
GLUCOSE SERPL-MCNC: 112 MG/DL (ref 65–100)
HCT VFR BLD AUTO: 32.3 % (ref 36.6–50.3)
HGB BLD-MCNC: 10.4 G/DL (ref 12.1–17)
M PNEUMO IGG SER IA-ACNC: 688 U/ML (ref 0–99)
M PNEUMO IGM SER IA-ACNC: <770 U/ML (ref 0–769)
MAGNESIUM SERPL-MCNC: 2.1 MG/DL (ref 1.6–2.4)
MCH RBC QN AUTO: 32.3 PG (ref 26–34)
MCHC RBC AUTO-ENTMCNC: 32.2 G/DL (ref 30–36.5)
MCV RBC AUTO: 100.3 FL (ref 80–99)
NRBC # BLD: 0 K/UL (ref 0–0.01)
NRBC BLD-RTO: 0 PER 100 WBC
PHOSPHATE SERPL-MCNC: 3.4 MG/DL (ref 2.6–4.7)
PLATELET # BLD AUTO: 132 K/UL (ref 150–400)
PMV BLD AUTO: 11.2 FL (ref 8.9–12.9)
POTASSIUM SERPL-SCNC: 3.5 MMOL/L (ref 3.5–5.1)
PROT SERPL-MCNC: 6.8 G/DL (ref 6.4–8.2)
RBC # BLD AUTO: 3.22 M/UL (ref 4.1–5.7)
SODIUM SERPL-SCNC: 142 MMOL/L (ref 136–145)
WBC # BLD AUTO: 5.9 K/UL (ref 4.1–11.1)

## 2019-11-01 PROCEDURE — 77010033678 HC OXYGEN DAILY

## 2019-11-01 PROCEDURE — 65660000000 HC RM CCU STEPDOWN

## 2019-11-01 PROCEDURE — 84100 ASSAY OF PHOSPHORUS: CPT

## 2019-11-01 PROCEDURE — 36415 COLL VENOUS BLD VENIPUNCTURE: CPT

## 2019-11-01 PROCEDURE — 97530 THERAPEUTIC ACTIVITIES: CPT

## 2019-11-01 PROCEDURE — 74011000258 HC RX REV CODE- 258: Performed by: INTERNAL MEDICINE

## 2019-11-01 PROCEDURE — 74011250637 HC RX REV CODE- 250/637: Performed by: INTERNAL MEDICINE

## 2019-11-01 PROCEDURE — 74011250636 HC RX REV CODE- 250/636: Performed by: INTERNAL MEDICINE

## 2019-11-01 PROCEDURE — 94760 N-INVAS EAR/PLS OXIMETRY 1: CPT

## 2019-11-01 PROCEDURE — 85027 COMPLETE CBC AUTOMATED: CPT

## 2019-11-01 PROCEDURE — 83735 ASSAY OF MAGNESIUM: CPT

## 2019-11-01 PROCEDURE — 80053 COMPREHEN METABOLIC PANEL: CPT

## 2019-11-01 PROCEDURE — 97116 GAIT TRAINING THERAPY: CPT

## 2019-11-01 PROCEDURE — 77030010545

## 2019-11-01 RX ORDER — DOXYCYCLINE HYCLATE 100 MG
100 TABLET ORAL EVERY 12 HOURS
Status: DISCONTINUED | OUTPATIENT
Start: 2019-11-01 | End: 2019-11-02 | Stop reason: HOSPADM

## 2019-11-01 RX ORDER — AMLODIPINE BESYLATE 5 MG/1
5 TABLET ORAL DAILY
Status: DISCONTINUED | OUTPATIENT
Start: 2019-11-01 | End: 2019-11-02 | Stop reason: HOSPADM

## 2019-11-01 RX ADMIN — Medication 10 ML: at 22:00

## 2019-11-01 RX ADMIN — THERA TABS 1 TABLET: TAB at 09:53

## 2019-11-01 RX ADMIN — APIXABAN 2.5 MG: 2.5 TABLET, FILM COATED ORAL at 09:53

## 2019-11-01 RX ADMIN — MIRABEGRON 50 MG: 25 TABLET, FILM COATED, EXTENDED RELEASE ORAL at 21:08

## 2019-11-01 RX ADMIN — AMLODIPINE BESYLATE 5 MG: 5 TABLET ORAL at 09:58

## 2019-11-01 RX ADMIN — PIPERACILLIN AND TAZOBACTAM 3.38 G: 3; .375 INJECTION, POWDER, LYOPHILIZED, FOR SOLUTION INTRAVENOUS at 00:42

## 2019-11-01 RX ADMIN — VITAMIN D, TAB 1000IU (100/BT) 1 TABLET: 25 TAB at 09:54

## 2019-11-01 RX ADMIN — DORZOLAMIDE HYDROCHLORIDE 1 DROP: 20 SOLUTION/ DROPS OPHTHALMIC at 16:15

## 2019-11-01 RX ADMIN — SERTRALINE HYDROCHLORIDE 50 MG: 50 TABLET ORAL at 21:08

## 2019-11-01 RX ADMIN — DOXYCYCLINE HYCLATE 100 MG: 100 TABLET, COATED ORAL at 09:53

## 2019-11-01 RX ADMIN — DORZOLAMIDE HYDROCHLORIDE 1 DROP: 20 SOLUTION/ DROPS OPHTHALMIC at 21:09

## 2019-11-01 RX ADMIN — APIXABAN 2.5 MG: 2.5 TABLET, FILM COATED ORAL at 21:08

## 2019-11-01 RX ADMIN — DOXYCYCLINE HYCLATE 100 MG: 100 TABLET, COATED ORAL at 21:00

## 2019-11-01 RX ADMIN — CARVEDILOL 3.12 MG: 3.12 TABLET, FILM COATED ORAL at 09:53

## 2019-11-01 RX ADMIN — Medication 10 ML: at 14:24

## 2019-11-01 RX ADMIN — MIRTAZAPINE 15 MG: 15 TABLET, FILM COATED ORAL at 22:10

## 2019-11-01 RX ADMIN — CARVEDILOL 3.12 MG: 3.12 TABLET, FILM COATED ORAL at 16:15

## 2019-11-01 RX ADMIN — LORATADINE 10 MG: 10 TABLET ORAL at 21:11

## 2019-11-01 RX ADMIN — DORZOLAMIDE HYDROCHLORIDE 1 DROP: 20 SOLUTION/ DROPS OPHTHALMIC at 09:53

## 2019-11-01 RX ADMIN — SIMVASTATIN 20 MG: 20 TABLET, FILM COATED ORAL at 22:11

## 2019-11-01 NOTE — PROGRESS NOTES
Spiritual Care Partner Volunteer visited patient in 5 Med Surg on 11/1/19.   Documented by: Tina Randall, MS., [address] [facility] ([id number])

## 2019-11-01 NOTE — PROGRESS NOTES
[address]  YOB: 1923          Assessment & Plan:   DELVIS, improving  CKD 3, baseline Cr 1.8. Previously followed by Dr. Stacey Badillo.  Lost to f/u since 2764  Diastolic CHF  HTN    Rec:  Continue to hold diuretic and ARB for now  Could consider low dose loop chronically but will have to watch renal function       Subjective:   CC: f/u DELVIS  HPI: Renal function continues to improve    ROS: no n/v/sob  Current Facility-Administered Medications   Medication Dose Route Frequency    doxycycline (VIBRA-TABS) tablet 100 mg  100 mg Oral Q12H    amLODIPine (NORVASC) tablet 5 mg  5 mg Oral DAILY    sodium chloride (NS) flush 5-40 mL  5-40 mL IntraVENous Q8H    sodium chloride (NS) flush 5-40 mL  5-40 mL IntraVENous PRN    sodium chloride (NS) flush 5-40 mL  5-40 mL IntraVENous Q8H    sodium chloride (NS) flush 5-40 mL  5-40 mL IntraVENous PRN    acetaminophen (TYLENOL) tablet 650 mg  650 mg Oral Q6H PRN    naloxone (NARCAN) injection 0.4 mg  0.4 mg IntraVENous PRN    albuterol-ipratropium (DUO-NEB) 2.5 MG-0.5 MG/3 ML  3 mL Nebulization Q6H PRN    apixaban (ELIQUIS) tablet 2.5 mg  2.5 mg Oral BID    carboxymethylcellulose sodium (CELLUVISC) 1 % ophthalmic solution 2 Drop  2 Drop Both Eyes DAILY PRN    carvedilol (COREG) tablet 3.125 mg  3.125 mg Oral BID WITH MEALS    cholecalciferol (VITAMIN D3) (1000 Units /25 mcg) tablet 1 Tab  1,000 Units Oral DAILY    colchicine tablet 0.6 mg  0.6 mg Oral BID PRN    loratadine (CLARITIN) tablet 10 mg  10 mg Oral DAILY    mirabegron ER (MYRBETRIQ) tablet 50 mg  50 mg Oral DAILY    mirtazapine (REMERON) tablet 15 mg  15 mg Oral QHS    sertraline (ZOLOFT) tablet 50 mg  50 mg Oral DAILY    simvastatin (ZOCOR) tablet 20 mg  20 mg Oral QHS    therapeutic multivitamin (THERAGRAN) tablet 1 Tab  1 Tab Oral DAILY    dorzolamide (TRUSOPT) 2 % ophthalmic solution 1 Drop  1 Drop Both Eyes TID Objective:     Vitals:  Blood pressure 151/67, pulse 60, temperature 97.3 °F (36.3 °C), resp. rate 18, height 5' 8\" (1.727 m), weight 72.4 kg (159 lb 9.8 oz), SpO2 98 %. Temp (24hrs), Av.7 °F (36.5 °C), Min:97.3 °F (36.3 °C), Max:98.1 °F (36.7 °C)      Intake and Output:  701 - 1900  In: -   Out: 750 [Urine:750]  10/30 1901 - 700  In: 300 [I.V.:300]  Out: 3989 [Urine:1725]    Physical Exam:               GENERAL ASSESSMENT: NAD  CHEST: CTA  HEART: S1S2  ABDOMEN: Soft,NT  : +bellamy +urine  EXTREMITY: no EDEMA  NEURO: Grossly non focal          ECG/rhythm:    Data Review      No results for input(s): TNIPOC in the last 72 hours. No lab exists for component: ITNL   No results for input(s): CPK, CKMB, TROIQ in the last 72 hours. Recent Labs     19  0405 10/31/19  0444 10/30/19  0040    142 143   K 3.5 3.7 4.0    106 107   CO2 28 28 28   BUN 48* 46* 40*   CREA 2.33* 2.50* 1.99*   * 95 98   PHOS 3.4 4.3 4.0   MG 2.1 1.9 2.0   CA 8.7 8.4* 8.7   ALB 3.2* 3.0* 3.2*   WBC 5.9 4.8 5.1   HGB 10.4* 10.1* 10.6*   HCT 32.3* 31.6* 33.0*   * 129* 146*      No results for input(s): INR, PTP, APTT, INREXT in the last 72 hours. Needs: urine analysis, urine sodium, protein and creatinine  Lab Results   Component Value Date/Time    Sodium,urine random 80 10/31/2019 07:26 PM    Creatinine, urine 92.90 10/31/2019 07:26 PM           : Sharonda Jordan MD  2019        [facility]:  [email or web address]  [place] office:  [address] [place] [address], [address] [place] [address],[address]  [place], [address] [facility]  Phone: [phone]  Fax :     [phone]    [place] office:  [address] [facility], [address]   Phone - [phone]  Fax - [phone]

## 2019-11-01 NOTE — PROGRESS NOTES
[address]  [address] [facility], [place], [address] [place] [address]  [phone]      Medical Progress Note      NAME: Terrence White   :  1923  MRM:  [id number]    Date/Time: 2019  9:45 AM       Assessment and Plan:   1. Acute respiratory failure with hypoxia and hypercapnia: improving. Required BiPAP on admission. Now hypoxia during sleep. Might have CIRO( but at this age doesn't warrant sleep study). Now supplement O2 decreased to 1 LPM. Likely due to acute diastolic CHF, with possible PNA, CIRO. IV diuretics on hold due to DELVIS. TTE preserved EF. Empiric IV abx (Zosyn/doxy). Follow up on sputum culture, PNA workup is negative. Viral resp panel negative. Appreciate cardiology and pulmonology input. Evaluated by speech as well, recommending continuing cardiac regular diet, thins. Out of bed, PT/OT     2. EDLVIS on CKD3: likely from diuretics, which are now on hold. Better renal function. Appreciate nephrology input.      3.  Atrial fibrillation: Pacemaker in place. Continue Coreg, Eliquis.     4.  CAD (coronary artery disease): no CP. Troponin negative. Cont Eliquis, statin, BB.     5.  Hypertension: BP not well controlled. On Coreg and added amlodipine. Holding losartan for now     6.  Chronic kidney disease: presumed stage 3. Unclear baseline. HOLD ARB     7.  History of cerebral infarction: on Elqiuis, statin     8.  Major depressive disorder: cont SSRI             Subjective:     Chief Complaint:  Follow up of pt who was admitted with DELVIS. Feels well. ROS:  (bold if positive, if negative)      Tolerating PT  Tolerating Diet        Objective:     Last 24hrs VS reviewed since prior progress note.  Most recent are:    Visit Vitals  /73 (BP 1 Location: Left arm, BP Patient Position: At rest)   Pulse 60   Temp 97.4 °F (36.3 °C)   Resp 18   Ht 5' 8\" (1.727 m)   Wt 72.4 kg (159 lb 9.8 oz)   SpO2 96%   BMI 24.27 kg/m²     SpO2 Readings from Last 6 Encounters:   19 96%    O2 Flow Rate (L/min): 1 l/min       Intake/Output Summary (Last 24 hours) at 11/1/2019 0945  Last data filed at 11/1/2019 0736  Gross per 24 hour   Intake 200 ml   Output 1050 ml   Net -850 ml        Physical Exam:    Gen:  Well-developed, well-nourished, in no acute distress  HEENT:  Pink conjunctivae, PERRL, hearing intact to voice, moist mucous membranes  Neck:  Supple, without masses, thyroid non-tender  Resp:  No accessory muscle use, clear breath sounds without wheezes rales or rhonchi  Card:  No murmurs, normal S1, S2 without thrills, bruits or peripheral edema  Abd:  Soft, non-tender, non-distended, normoactive bowel sounds are present, no palpable organomegaly and no detectable hernias  Lymph:  No cervical or inguinal adenopathy  Musc:  No cyanosis or clubbing  Skin:  No rashes or ulcers, skin turgor is good  Neuro:  Cranial nerves are grossly intact, no focal motor weakness, follows commands appropriately  Psych:  Good insight, oriented to person, place and time, alert  __________________________________________________________________  Medications Reviewed: (see below)  Medications:     Current Facility-Administered Medications   Medication Dose Route Frequency    doxycycline (VIBRA-TABS) tablet 100 mg  100 mg Oral Q12H    piperacillin-tazobactam (ZOSYN) 3.375 g in 0.9% sodium chloride (MBP/ADV) 100 mL  3.375 g IntraVENous Q12H    sodium chloride (NS) flush 5-40 mL  5-40 mL IntraVENous Q8H    sodium chloride (NS) flush 5-40 mL  5-40 mL IntraVENous PRN    sodium chloride (NS) flush 5-40 mL  5-40 mL IntraVENous Q8H    sodium chloride (NS) flush 5-40 mL  5-40 mL IntraVENous PRN    acetaminophen (TYLENOL) tablet 650 mg  650 mg Oral Q6H PRN    naloxone (NARCAN) injection 0.4 mg  0.4 mg IntraVENous PRN    albuterol-ipratropium (DUO-NEB) 2.5 MG-0.5 MG/3 ML  3 mL Nebulization Q6H PRN    apixaban (ELIQUIS) tablet 2.5 mg  2.5 mg Oral BID    carboxymethylcellulose sodium (CELLUVISC) 1 % ophthalmic solution 2 Drop  2 Drop Both Eyes DAILY PRN    carvedilol (COREG) tablet 3.125 mg  3.125 mg Oral BID WITH MEALS    cholecalciferol (VITAMIN D3) (1000 Units /25 mcg) tablet 1 Tab  1,000 Units Oral DAILY    colchicine tablet 0.6 mg  0.6 mg Oral BID PRN    loratadine (CLARITIN) tablet 10 mg  10 mg Oral DAILY    mirabegron ER (MYRBETRIQ) tablet 50 mg  50 mg Oral DAILY    mirtazapine (REMERON) tablet 15 mg  15 mg Oral QHS    sertraline (ZOLOFT) tablet 50 mg  50 mg Oral DAILY    simvastatin (ZOCOR) tablet 20 mg  20 mg Oral QHS    therapeutic multivitamin (THERAGRAN) tablet 1 Tab  1 Tab Oral DAILY    dorzolamide (TRUSOPT) 2 % ophthalmic solution 1 Drop  1 Drop Both Eyes TID        Lab Data Reviewed: (see below)  Lab Review:     Recent Labs     11/01/19  0405 10/31/19  0444 10/30/19  0040   WBC 5.9 4.8 5.1   HGB 10.4* 10.1* 10.6*   HCT 32.3* 31.6* 33.0*   * 129* 146*     Recent Labs     11/01/19 0405 10/31/19  0444 10/30/19  0040    142 143   K 3.5 3.7 4.0    106 107   CO2 28 28 28   * 95 98   BUN 48* 46* 40*   CREA 2.33* 2.50* 1.99*   CA 8.7 8.4* 8.7   MG 2.1 1.9 2.0   PHOS 3.4 4.3 4.0   ALB 3.2* 3.0* 3.2*   TBILI 0.6  --  0.5   SGOT 21  --  20   ALT 15  --  15     [facility] results found [facility]: [facility]  Recent Labs     10/29/19  2120   PH 7.39   PCO2 45   PO2 69*   HCO3 27*     No results for input(s): INR, INREXT in the last 72 hours.   All Micro Results     Procedure Component Value Units Date/Time    CULTURE, BLOOD, PERIPHERAL [[id number]] Collected:  10/29/19 0729    Order Status:  Completed Specimen:  Blood Updated:  11/01/19 0529     Special Requests: NO SPECIAL REQUESTS        Culture result: NO GROWTH 3 DAYS       CULTURE, BLOOD, PERIPHERAL [[id number]] Collected:  10/29/19 0729    Order Status:  Completed Specimen:  Blood Updated:  11/01/19 0529     Special Requests: NO SPECIAL REQUESTS        Culture result: NO GROWTH 3 DAYS       LEGIONELLA PNEUMOPHILA AG, URINE [[id number]] Collected:  10/30/19 0016    Order Status:  Completed Specimen:  Urine Updated:  10/31/19 1736     Source URINE        L pneumophila S1 Ag, urine NEGATIVE         Comment: (NOTE)  Presumptive negative for L. pneumophila serogroup 1 antigen in urine,  suggesting no recent or current infection. Legionnaires' disease  cannot be ruled out since other serogroups and species may also  cause disease. Performed At: [address] [id number]  Darian De Leon MD :[id number]         TOÑO Urban, UR/CSF [[id number]] Collected:  10/30/19 0016    Order Status:  Completed Specimen:  Other from Miscellaneous sample Updated:  10/31/19 1636     Source URINE        Specimen Urine     Streptococcus pneumoniae Ag NEGATIVE         Fluid culture Not Indicated     Organism ID Not indicated. Please note Comment        Comment: (NOTE)  College [employer] standards require a culture to be  performed on CSF specimens submitted for bacterial antigen testing. (CAP [id number]) Urine specimens will not be cultured.   Performed At: [address] [id number]  Darian De Leon MD CH:[id number]         Usman Umanzor [address] [[id number]] Collected:  10/29/19 1825    Order Status:  Completed Specimen:  Nasopharyngeal Updated:  10/29/19 2204     Adenovirus NOT DETECTED        Coronavirus 229E NOT DETECTED        Coronavirus HKU1 NOT DETECTED        Coronavirus CVNL63 NOT DETECTED        Coronavirus OC43 NOT DETECTED        Metapneumovirus NOT DETECTED        Rhinovirus and Enterovirus NOT DETECTED        Influenza A NOT DETECTED        Influenza A, subtype H1 NOT DETECTED        Influenza A, subtype H3 NOT DETECTED        INFLUENZA A H1N1 PCR NOT DETECTED        Influenza B NOT DETECTED        Parainfluenza 1 NOT DETECTED        Parainfluenza 2 NOT DETECTED        Parainfluenza 3 NOT DETECTED        Parainfluenza virus 4 NOT DETECTED        RSV by PCR NOT DETECTED Bordetella pertussis - PCR NOT DETECTED        Chlamydophila pneumoniae DNA, QL, PCR NOT DETECTED        Mycoplasma pneumoniae DNA, QL, PCR NOT DETECTED       CULTURE, RESPIRATORY/SPUTUM/BRONCH Trang Reece [[id number]] Collected:  10/29/19 0845    Order Status:  Canceled Specimen:  Sputum     URINE CULTURE HOLD SAMPLE [[id number]] Collected:  10/29/19 0800    Order Status:  Completed Specimen:  Urine from Serum Updated:  10/29/19 0803     Urine culture hold       URINE ON HOLD IN MICROBIOLOGY DEPT FOR 3 DAYS. IF UNPRESERVED URINE IS SUBMITTED, IT CANNOT BE USED FOR ADDITIONAL TESTING AFTER 24 HRS, RECOLLECTION WILL BE REQUIRED. I have reviewed notes of prior 24hr. Other pertinent lab:       Total time spent with patient: [address] discussed with: Patient, Nursing Staff and >50% of time spent in counseling and coordination of care    Discussed:  Care Plan    Prophylaxis:  eliquis     Disposition:  SNF/LTC           ___________________________________________________    Attending Physician: Zoë Reyes MD

## 2019-11-01 NOTE — PROGRESS NOTES
Bedside and Verbal shift change report given to Steff Alexis RN  (oncoming nurse) by Saskia Rojas RN (offgoing nurse). Report included the following information SBAR, Kardex, Intake/Output, MAR, Accordion and Recent Results.

## 2019-11-01 NOTE — PROGRESS NOTES
Problem: Mobility Impaired (Adult and Pediatric)  Goal: *Acute Goals and Plan of Care (Insert Text)  Description  FUNCTIONAL STATUS PRIOR TO ADMISSION: Patient required moderate assistance for basic and instrumental ADLs. Feed self per [facility] with modified utensils. The patient was functional at the wheelchair level propelling with BLE's and was Mod A for transfers to the chair. HOME SUPPORT PRIOR TO ADMISSION: The patient lived in [address] [place] [address] at [facility]. Physical Therapy Goals  Initiated 10/30/2019  1. Patient will move from supine to sit and sit to supine  in bed with minimal assistance/contact guard assist within 7 day(s). 2.  Patient will transfer from bed to chair and chair to bed with minimal assistance/contact guard assist using the least restrictive device within 7 day(s). 3.  Patient will perform sit to stand with minimal assistance/contact guard assist within 7 day(s). 4.  Patient will ambulate with moderate assistance  for 50 feet with the least restrictive device within 7 day(s). Outcome: Progressing Towards Goal   PHYSICAL THERAPY TREATMENT  Patient: Logan Guerra (9 y.o. male)  Date: 11/1/2019  Diagnosis: Acute respiratory failure with hypoxia and hypercapnia (HCC) [J96.01, J96.02] <principal problem not specified>       Precautions: Fall, DNR  Chart, physical therapy assessment, plan of care and goals were reviewed. ASSESSMENT  Patient continues with skilled PT services and is progressing towards goals. Limited by generalized weakness and decreased activity tolerance. Received in recliner and required Max A to stand d/t low seat height. Took several steps to [facility] with physical assist to manage RW. Improved standing from commode and EOB(Min-Mod A). A x 2 to reposition in the bed.     Current Level of Function Impacting Discharge (mobility/balance): A x 1-2 depending on fatigue level    Other factors to consider for discharge: None         PLAN :  Patient continues to benefit from skilled intervention to address the above impairments. Continue treatment per established plan of care. to address goals. Recommendation for discharge: (in order for the patient to meet his/her long term goals)  Therapy up to 5 days/week in SNF setting    This discharge recommendation:  Has been made in collaboration with the attending provider and/or case management    IF patient discharges home will need the following DME: patient owns DME required for discharge       SUBJECTIVE:   Patient stated I think I need to have a BM first.    OBJECTIVE DATA SUMMARY:   Critical Behavior:  Neurologic State: Alert  Orientation Level: Oriented X4  Cognition: Follows commands, Poor safety awareness  Safety/Judgement: Awareness of environment, Insight into deficits  Functional Mobility Training:  Bed Mobility:  Rolling: Minimum assistance     Sit to Supine: Minimum assistance           Transfers:  Sit to Stand: Moderate assistance  Stand to Sit: Minimum assistance        Bed to Chair: Moderate assistance                    Balance:  Sitting: Intact  Standing: Impaired  Standing - Static: Fair  Standing - Dynamic : Fair  Ambulation/Gait Training:  Distance (ft): 4 Feet (ft)(x 2)  Assistive Device: Walker, rolling;Gait belt  Ambulation - Level of Assistance: Moderate assistance                 Base of Support: Narrowed     Speed/Roxie: Slow;Shuffled  Step Length: Left shortened;Right shortened                  Activity Tolerance:   Good and requires rest breaks  Please refer to the flowsheet for vital signs taken during this treatment.     After treatment patient left in no apparent distress:   Supine in bed, Call bell within reach, Bed / chair alarm activated and Side rails x 3    COMMUNICATION/COLLABORATION:   The patients plan of care was discussed with: Registered Nurse    Kisha Herrera, PT   Time Calculation: 27 mins

## 2019-11-01 NOTE — PROGRESS NOTES
CM Note:  [facility] can accept pt on Saturday. Daughter to transport. Spoke with Mindy Maldonado at [facility]. PT/OT notes sent in [facility]. They will evaluate and try to skill pt for a few days.   MATEUS Thurston

## 2019-11-01 NOTE — PROGRESS NOTES
[facility] Dosing Services: IV to PO Conversion    The pharmacist has determined that this patient meets P & T approved criteria for conversion from IV to oral therapy for the following medication:Doxycycline 100 mg IV q12hr x10 doses left    The pharmacist has written the following order for the patient: Doxycycline 100 mg po q12hr x10 doses left  The pharmacist will continue to monitor the patient's status and advise the physician if conversion back to IV therapy is recommended.     Signed Mendel Hook Contact information:  [phone]

## 2019-11-01 NOTE — PROGRESS NOTES
Interdisciplinary team rounds were held 11/1/2019 with the following team members:Care Management, Nursing, Nutrition and Physician.     Plan of care monitor Kidney function overnight back to [facility] 11/2

## 2019-11-01 NOTE — PROGRESS NOTES
Bedside and Verbal shift change report given to Sudhir Barker RN (oncoming nurse) by Andrea Tapia RN (offgoing nurse). Report included the following information SBAR, Kardex, Procedure Summary, Intake/Output, MAR, Accordion and Recent Results.

## 2019-11-02 VITALS
HEIGHT: 68 IN | BODY MASS INDEX: 25.85 KG/M2 | SYSTOLIC BLOOD PRESSURE: 173 MMHG | DIASTOLIC BLOOD PRESSURE: 74 MMHG | WEIGHT: 170.6 LBS | TEMPERATURE: 97.6 F | OXYGEN SATURATION: 93 % | HEART RATE: 60 BPM | RESPIRATION RATE: 18 BRPM

## 2019-11-02 LAB
ANION GAP SERPL CALC-SCNC: 8 MMOL/L (ref 5–15)
BUN SERPL-MCNC: 49 MG/DL (ref 6–20)
BUN/CREAT SERPL: 26 (ref 12–20)
CALCIUM SERPL-MCNC: 8.8 MG/DL (ref 8.5–10.1)
CHLORIDE SERPL-SCNC: 108 MMOL/L (ref 97–108)
CO2 SERPL-SCNC: 27 MMOL/L (ref 21–32)
CREAT SERPL-MCNC: 1.91 MG/DL (ref 0.7–1.3)
GLUCOSE SERPL-MCNC: 95 MG/DL (ref 65–100)
POTASSIUM SERPL-SCNC: 3.9 MMOL/L (ref 3.5–5.1)
SODIUM SERPL-SCNC: 143 MMOL/L (ref 136–145)

## 2019-11-02 PROCEDURE — 80048 BASIC METABOLIC PNL TOTAL CA: CPT

## 2019-11-02 PROCEDURE — 36415 COLL VENOUS BLD VENIPUNCTURE: CPT

## 2019-11-02 PROCEDURE — 74011250637 HC RX REV CODE- 250/637: Performed by: INTERNAL MEDICINE

## 2019-11-02 RX ORDER — DOXYCYCLINE HYCLATE 100 MG
100 TABLET ORAL EVERY 12 HOURS
Qty: 8 TAB | Refills: 0 | Status: SHIPPED | OUTPATIENT
Start: 2019-11-02

## 2019-11-02 RX ORDER — AMLODIPINE BESYLATE 5 MG/1
5 TABLET ORAL DAILY
Qty: 30 TAB | Refills: 0 | Status: SHIPPED | OUTPATIENT
Start: 2019-11-03

## 2019-11-02 RX ADMIN — APIXABAN 2.5 MG: 2.5 TABLET, FILM COATED ORAL at 08:41

## 2019-11-02 RX ADMIN — DOXYCYCLINE HYCLATE 100 MG: 100 TABLET, COATED ORAL at 08:41

## 2019-11-02 RX ADMIN — DORZOLAMIDE HYDROCHLORIDE 1 DROP: 20 SOLUTION/ DROPS OPHTHALMIC at 15:10

## 2019-11-02 RX ADMIN — AMLODIPINE BESYLATE 5 MG: 5 TABLET ORAL at 08:41

## 2019-11-02 RX ADMIN — DORZOLAMIDE HYDROCHLORIDE 1 DROP: 20 SOLUTION/ DROPS OPHTHALMIC at 08:43

## 2019-11-02 RX ADMIN — CARVEDILOL 3.12 MG: 3.12 TABLET, FILM COATED ORAL at 16:31

## 2019-11-02 RX ADMIN — CARVEDILOL 3.12 MG: 3.12 TABLET, FILM COATED ORAL at 08:41

## 2019-11-02 RX ADMIN — VITAMIN D, TAB 1000IU (100/BT) 1 TABLET: 25 TAB at 08:41

## 2019-11-02 RX ADMIN — THERA TABS 1 TABLET: TAB at 08:41

## 2019-11-02 NOTE — DISCHARGE SUMMARY
Hospitalist Discharge Summary     Patient ID:    Barbara Calero  [id number]  64 y.o.  5/29/1923    Admit date: 10/29/2019    Discharge date and time: 11/2/2019    Admission Diagnoses: Acute respiratory failure with hypoxia and hypercapnia ([facility] [J96.01, J96.02]    Chronic Diagnoses:    Problem List as of 11/2/2019 Never Reviewed          Codes Class Noted - Resolved    Hemiplegia ([facility] ICD-10-CM: G81.90  ICD-9-CM: 342.90  10/29/2019 - Present    Overview Signed 10/29/2019  8:38 AM by Alka Rosa MD     left side             Cerebral infarction [facility]) ICD-10-CM: I63.9  ICD-9-CM: 434.91  10/29/2019 - Present        Hypertension ICD-10-CM: I10  ICD-9-CM: 401.9  10/29/2019 - Present        Atrial fibrillation ([facility] ICD-10-CM: I48.91  ICD-9-CM: 427.31  10/29/2019 - Present        Chronic kidney disease ICD-10-CM: N18.9  ICD-9-CM: 585.9  10/29/2019 - Present        CAD (coronary artery disease) ICD-10-CM: I25.10  ICD-9-CM: 414.00  10/29/2019 - Present        Pacemaker ICD-10-CM: Z95.0  ICD-9-CM: V45.01  10/29/2019 - Present        Major depressive disorder ICD-10-CM: F32.9  ICD-9-CM: 296.20  10/29/2019 - Present        Acute respiratory failure with hypoxia and hypercapnia (HCC) ICD-10-CM: J96.01, J96.02  ICD-9-CM: 518.81  10/29/2019 - Present              Discharge Medications:   Current Discharge Medication List      START taking these medications    Details   amLODIPine (NORVASC) 5 mg tablet Take 1 Tab by mouth daily. Qty: 30 Tab, Refills: 0      doxycycline (VIBRA-TABS) 100 mg tablet Take 1 Tab by mouth every twelve (12) hours. Qty: 8 Tab, Refills: 0         CONTINUE these medications which have NOT CHANGED    Details   apixaban (ELIQUIS) 2.5 mg tablet Take 2.5 mg by mouth two (2) times a day. cholecalciferol (VITAMIN D3) (1000 Units /25 mcg) tablet Take 1,000 Units by mouth daily. loratadine (CLARITIN) 10 mg tablet Take 10 mg by mouth daily.       acetaminophen (TYLENOL) 325 mg tablet Take 650 mg by mouth every four (4) hours as needed for Pain.      glucosamine-chondroitin (ARTHX) 500-400 mg cap Take 2 Caps by mouth two (2) times a day. brinzolamide (AZOPT) 1 % ophthalmic suspension Administer 1 Drop to both eyes three (3) times daily. carvedilol (COREG) 3.125 mg tablet Take 3.125 mg by mouth two (2) times a day. mirabegron ER (MYRBETRIQ) 50 mg ER tablet Take 50 mg by mouth daily. simvastatin (ZOCOR) 20 mg tablet Take 20 mg by mouth nightly. carboxymethylcellulose sodium (REFRESH TEARS) 0.5 % drop ophthalmic solution Administer 2 Drops to both eyes three (3) times daily. colchicine 0.6 mg tablet Take 0.6 mg by mouth two (2) times daily as needed (gout attack). sertraline (ZOLOFT) 50 mg tablet Take 50 mg by mouth daily. Indications: major depressive disorder      mirtazapine (REMERON) 15 mg tablet Take 15 mg by mouth nightly. therapeutic multivitamin (THERAGRAN) tablet Take 1 Tab by mouth daily. albuterol (PROVENTIL VENTOLIN) 2.5 mg /3 mL (0.083 %) nebu 2.5 mg by Nebulization route every six (6) hours as needed (shortness of breath). STOP taking these medications       losartan (COZAAR) 25 mg tablet Comments:   Reason for Stopping: Follow up Care:    1. Marisel Garcia MD in 1-2 weeks  2. Diet:  Cardiac Diet    Disposition:  Home. Advanced Directive:    Discharge Exam:  See today's note.     CONSULTATIONS: Nephrology    Significant Diagnostic Studies:   Recent Labs     11/01/19  0405 10/31/19  0444   WBC 5.9 4.8   HGB 10.4* 10.1*   HCT 32.3* 31.6*   * 129*     Recent Labs     11/02/19 0458 11/01/19  0405 10/31/19  0444    142 142   K 3.9 3.5 3.7    107 106   CO2 27 28 28   BUN 49* 48* 46*   CREA 1.91* 2.33* 2.50*   GLU 95 112* 95   CA 8.8 8.7 8.4*   MG  --  2.1 1.9   PHOS  --  3.4 4.3     Recent Labs     11/01/19  0405 10/31/19  0444   SGOT 21  --    ALT 15  --    AP 70  --    TBILI 0.6  --    TP 6.8  --    ALB 3.2* 3.0* GLOB 3.6  --      No results for input(s): INR, PTP, APTT, INREXT in the last 72 hours. No results for input(s): FE, TIBC, PSAT, FERR in the last 72 hours. No results for input(s): PH, PCO2, PO2 in the last 72 hours. No results for input(s): CPK, CKMB in the last 72 hours. No lab exists for component: TROPONINI  No results found for: Karena 57:   1. Acute respiratory failure with hypoxia and hypercapnia: improving. Required BiPAP on admission. Now hypoxia during sleep. Might have CIRO( but at this age doesn't warrant sleep study). Now supplement O2 decreased to 1 LPM. Likely due to acute diastolic CHF, with possible PNA, CIRO. IV diuretics on hold due to DELVIS. TTE preserved EF. Empiric IV abx (Zosyn/doxy). Follow up on sputum culture, PNA workup is negative. Viral resp panel negative. Appreciate cardiology and pulmonology input. Evaluated by speech as well, recommending continuing cardiac regular diet, thins. Out of bed, PT/OT     2. DELVIS on CKD3: likely from diuretics, which are now on hold. Better renal function. Looks back to baseline. Appreciate nephrology input.      3.  Atrial fibrillation: Pacemaker in place. Continue Coreg, Eliquis.     4.  CAD (coronary artery disease): no CP. Troponin negative. Cont Eliquis, statin, BB.     5.  Hypertension: BP not well controlled. On Coreg and added amlodipine. Holding losartan for now     6.  Chronic kidney disease: presumed stage 3. Unclear baseline.  HOLD ARB     7.  History of cerebral infarction: on Elqiuis, statin     8.  Major depressive disorder: cont SSRI      discharged in improved condition    Spent 35 minutes         Signed:  Livier Morales MD  11/2/2019  9:13 AM

## 2019-11-02 NOTE — PROGRESS NOTES
TRANSFER - OUT REPORT:    Verbal report given to [facility] SURGICAL [facility] CARDIOVASCULAR [facility] (name) on Gladys Dominique  being transferred to [facility] for routine progression of care       Report consisted of patients Situation, Background, Assessment and   Recommendations(SBAR). Information from the following report(s) SBAR, Kardex, ED Summary, Intake/Output, MAR, Accordion and Recent Results was reviewed with the receiving nurse. Lines:       Opportunity for questions and clarification was provided. 1700: Pt family member given copy of AVS. Pt IVs were removed. Scripts, facesheet and AVS copy given to wheelchair transportation. Pt left with transportation.

## 2019-11-02 NOTE — PROGRESS NOTES
[facility]  [address] [facility], [place], [address] [place] [address]  [phone]      Medical Progress Note      NAME: Cooper Guaman   :  1923  MRM:  [id number]    Date/Time: 2019  9:45 AM       Assessment and Plan:   1. Acute respiratory failure with hypoxia and hypercapnia: improving. Required BiPAP on admission. Now hypoxia during sleep. Might have CIRO( but at this age doesn't warrant sleep study). Now supplement O2 decreased to 1 LPM. Likely due to acute diastolic CHF, with possible PNA, CIRO. IV diuretics on hold due to DELVIS. TTE preserved EF. Empiric IV abx (Zosyn/doxy). Follow up on sputum culture, PNA workup is negative. Viral resp panel negative. Appreciate cardiology and pulmonology input. Evaluated by speech as well, recommending continuing cardiac regular diet, thins. Out of bed, PT/OT     2. DELVIS on CKD3: likely from diuretics, which are now on hold. Better renal function. Looks back to baseline. Appreciate nephrology input.      3.  Atrial fibrillation: Pacemaker in place. Continue Coreg, Eliquis.     4.  CAD (coronary artery disease): no CP. Troponin negative. Cont Eliquis, statin, BB.     5.  Hypertension: BP not well controlled. On Coreg and added amlodipine. Holding losartan for now     6.  Chronic kidney disease: presumed stage 3. Unclear baseline. HOLD ARB     7.  History of cerebral infarction: on Elqiuis, statin     8.  Major depressive disorder: cont SSRI             Subjective:     Chief Complaint:  Follow up of pt who was admitted with DELVIS. Feels well. ROS:  (bold if positive, if negative)      Tolerating PT  Tolerating Diet        Objective:     Last 24hrs VS reviewed since prior progress note.  Most recent are:    Visit Vitals  /74 (BP 1 Location: Left arm, BP Patient Position: At rest)   Pulse 61   Temp 97.6 °F (36.4 °C)   Resp 18   Ht 5' 8\" (1.727 m)   Wt 77.4 kg (170 lb 9.6 oz)   SpO2 93%   BMI 25.94 kg/m²     SpO2 Readings from Last 6 Encounters:   11/02/19 93%    O2 Flow Rate (L/min): 1 l/min       Intake/Output Summary (Last 24 hours) at 11/2/2019 0902  Last data filed at 11/1/2019 1937  Gross per 24 hour   Intake 240 ml   Output 450 ml   Net -210 ml        Physical Exam:    Gen:  Well-developed, well-nourished, in no acute distress  HEENT:  Pink conjunctivae, PERRL, hearing intact to voice, moist mucous membranes  Neck:  Supple, without masses, thyroid non-tender  Resp:  No accessory muscle use, clear breath sounds without wheezes rales or rhonchi  Card:  No murmurs, normal S1, S2 without thrills, bruits or peripheral edema  Abd:  Soft, non-tender, non-distended, normoactive bowel sounds are present, no palpable organomegaly and no detectable hernias  Lymph:  No cervical or inguinal adenopathy  Musc:  No cyanosis or clubbing  Skin:  No rashes or ulcers, skin turgor is good  Neuro:  Cranial nerves are grossly intact, no focal motor weakness, follows commands appropriately  Psych:  Good insight, oriented to person, place and time, alert  __________________________________________________________________  Medications Reviewed: (see below)  Medications:     Current Facility-Administered Medications   Medication Dose Route Frequency    doxycycline (VIBRA-TABS) tablet 100 mg  100 mg Oral Q12H    amLODIPine (NORVASC) tablet 5 mg  5 mg Oral DAILY    sodium chloride (NS) flush 5-40 mL  5-40 mL IntraVENous Q8H    sodium chloride (NS) flush 5-40 mL  5-40 mL IntraVENous PRN    sodium chloride (NS) flush 5-40 mL  5-40 mL IntraVENous Q8H    sodium chloride (NS) flush 5-40 mL  5-40 mL IntraVENous PRN    acetaminophen (TYLENOL) tablet 650 mg  650 mg Oral Q6H PRN    naloxone (NARCAN) injection 0.4 mg  0.4 mg IntraVENous PRN    albuterol-ipratropium (DUO-NEB) 2.5 MG-0.5 MG/3 ML  3 mL Nebulization Q6H PRN    apixaban (ELIQUIS) tablet 2.5 mg  2.5 mg Oral BID    carboxymethylcellulose sodium (CELLUVISC) 1 % ophthalmic solution 2 Drop  2 Drop Both Eyes DAILY PRN    carvedilol (COREG) tablet 3.125 mg  3.125 mg Oral BID WITH MEALS    cholecalciferol (VITAMIN D3) (1000 Units /25 mcg) tablet 1 Tab  1,000 Units Oral DAILY    colchicine tablet 0.6 mg  0.6 mg Oral BID PRN    loratadine (CLARITIN) tablet 10 mg  10 mg Oral DAILY    mirabegron ER (MYRBETRIQ) tablet 50 mg  50 mg Oral DAILY    mirtazapine (REMERON) tablet 15 mg  15 mg Oral QHS    sertraline (ZOLOFT) tablet 50 mg  50 mg Oral DAILY    simvastatin (ZOCOR) tablet 20 mg  20 mg Oral QHS    therapeutic multivitamin (THERAGRAN) tablet 1 Tab  1 Tab Oral DAILY    dorzolamide (TRUSOPT) 2 % ophthalmic solution 1 Drop  1 Drop Both Eyes TID        Lab Data Reviewed: (see below)  Lab Review:     Recent Labs     11/01/19  0405 10/31/19  0444   WBC 5.9 4.8   HGB 10.4* 10.1*   HCT 32.3* 31.6*   * 129*     Recent Labs     11/02/19  0458 11/01/19  0405 10/31/19  0444    142 142   K 3.9 3.5 3.7    107 106   CO2 27 28 28   GLU 95 112* 95   BUN 49* 48* 46*   CREA 1.91* 2.33* 2.50*   CA 8.8 8.7 8.4*   MG  --  2.1 1.9   PHOS  --  3.4 4.3   ALB  --  3.2* 3.0*   TBILI  --  0.6  --    SGOT  --  21  --    ALT  --  15  --      No results found for: GLUCPOC  No results for input(s): PH, PCO2, PO2, HCO3, FIO2 in the last 72 hours. No results for input(s): INR, INREXT, INREXT in the last 72 hours.   All Micro Results     Procedure Component Value Units Date/Time    CULTURE, BLOOD, PERIPHERAL [[id number]] Collected:  10/29/19 0729    Order Status:  Completed Specimen:  Blood Updated:  11/02/19 0648     Special Requests: NO SPECIAL REQUESTS        Culture result: NO GROWTH 4 DAYS       CULTURE, BLOOD, PERIPHERAL [[id number]] Collected:  10/29/19 0729    Order Status:  Completed Specimen:  Blood Updated:  11/02/19 0648     Special Requests: NO SPECIAL REQUESTS        Culture result: NO GROWTH 4 DAYS       LEGIONELLA PNEUMOPHILA AG, URINE [[id number]] Collected:  10/30/19 0016    Order Status:  Completed Specimen: Urine Updated:  10/31/19 1736     Source URINE        L pneumophila S1 Ag, urine NEGATIVE         Comment: (NOTE)  Presumptive negative for L. pneumophila serogroup 1 antigen in urine,  suggesting no recent or current infection. Legionnaires' disease  cannot be ruled out since other serogroups and species may also  cause disease. Performed At: [address] [id number]  Gerson Robertson MD LS:[id number]         TOÑO Orozco, UR/CSF [[id number]] Collected:  10/30/19 0016    Order Status:  Completed Specimen:  Other from Miscellaneous sample Updated:  10/31/19 1636     Source URINE        Specimen Urine     Streptococcus pneumoniae Ag NEGATIVE         Fluid culture Not Indicated     Organism ID Not indicated. Please note Comment        Comment: (NOTE)  College of [employer]ologists standards require a culture to be  performed on CSF specimens submitted for bacterial antigen testing. (CAP [id number]) Urine specimens will not be cultured.   Performed At: [address] [id number]  Gerson Robertson MD DI:[id number]         Usman Almonte [address] [[id number]] Collected:  10/29/19 1825    Order Status:  Completed Specimen:  Nasopharyngeal Updated:  10/29/19 2204     Adenovirus NOT DETECTED        Coronavirus 229E NOT DETECTED        Coronavirus HKU1 NOT DETECTED        Coronavirus CVNL63 NOT DETECTED        Coronavirus OC43 NOT DETECTED        Metapneumovirus NOT DETECTED        Rhinovirus and Enterovirus NOT DETECTED        Influenza A NOT DETECTED        Influenza A, subtype H1 NOT DETECTED        Influenza A, subtype H3 NOT DETECTED        INFLUENZA A H1N1 PCR NOT DETECTED        Influenza B NOT DETECTED        Parainfluenza 1 NOT DETECTED        Parainfluenza 2 NOT DETECTED        Parainfluenza 3 NOT DETECTED        Parainfluenza virus 4 NOT DETECTED        RSV by PCR NOT DETECTED        Bordetella pertussis - PCR NOT DETECTED Chlamydophila pneumoniae DNA, QL, PCR NOT DETECTED        Mycoplasma pneumoniae DNA, QL, PCR NOT DETECTED       CULTURE, RESPIRATORY/SPUTUM/BRONCH Lewanda Poster [[id number]] Collected:  10/29/19 0845    Order Status:  Canceled Specimen:  Sputum     URINE CULTURE HOLD SAMPLE [[id number]] Collected:  10/29/19 0800    Order Status:  Completed Specimen:  Urine from Serum Updated:  10/29/19 0803     Urine culture hold       URINE ON HOLD IN MICROBIOLOGY DEPT FOR 3 DAYS. IF UNPRESERVED URINE IS SUBMITTED, IT CANNOT BE USED FOR ADDITIONAL TESTING AFTER 24 HRS, RECOLLECTION WILL BE REQUIRED. I have reviewed notes of prior 24hr. Other pertinent lab:       Total time spent with patient: [place] discussed with: Patient, Nursing Staff and >50% of time spent in counseling and coordination of care    Discussed:  Care Plan    Prophylaxis:  eliquis     Disposition:  SNF/LTC           ___________________________________________________    Attending Physician: Ivet Mckeon MD

## 2019-11-02 NOTE — PROGRESS NOTES
Pharmacist Discharge Medication Reconciliation    Discharge Provider:  Dr Hiral Orozco       Discharge Medications:      My Medications        START taking these medications        Instructions Each Dose to Equal Morning Noon Evening Bedtime   amLODIPine 5 mg tablet  Commonly known as:  Amy Oliveira  Start taking on:  11/3/2019    Your last dose was: Your next dose is: Take 1 Tab by mouth daily. 5 mg                 doxycycline 100 mg tablet  Commonly known as:  VIBRA-TABS    Your last dose was: Your next dose is: Take 1 Tab by mouth every twelve (12) hours. 100 mg                        CONTINUE taking these medications        Instructions Each Dose to Equal Morning Noon Evening Bedtime   acetaminophen 325 mg tablet  Commonly known as:  TYLENOL    Your last dose was: Your next dose is: Take 650 mg by mouth every four (4) hours as needed for Pain. 650 mg                 albuterol 2.5 mg /3 mL (0.083 %) Nebu  Commonly known as:  PROVENTIL VENTOLIN    Your last dose was: Your next dose is:          2.5 mg by Nebulization route every six (6) hours as needed (shortness of breath). 2.5 mg                 AZOPT 1 % ophthalmic suspension  Generic drug:  brinzolamide    Your last dose was: Your next dose is:          Administer 1 Drop to both eyes three (3) times daily. 1 Drop                 CLARITIN 10 mg tablet  Generic drug:  loratadine    Your last dose was: Your next dose is: Take 10 mg by mouth daily. 10 mg                 colchicine 0.6 mg tablet    Your last dose was: Your next dose is: Take 0.6 mg by mouth two (2) times daily as needed (gout attack). 0.6 mg                 COREG 3.125 mg tablet  Generic drug:  carvedilol    Your last dose was: Your next dose is: Take 3.125 mg by mouth two (2) times a day. 3.125 mg                 ELIQUIS 2.5 mg tablet  Generic drug:  apixaban    Your last dose was:       Your next dose is: Take 2.5 mg by mouth two (2) times a day. 2.5 mg                 glucosamine-chondroitin 500-400 mg Cap  Commonly known as:  [facility]    Your last dose was: Your next dose is: Take 2 Caps by mouth two (2) times a day. 2 Cap                 MYRBETRIQ 50 mg ER tablet  Generic drug:  mirabegron ER    Your last dose was: Your next dose is: Take 50 mg by mouth daily. 50 mg                 REFRESH TEARS 0.5 % Drop ophthalmic solution  Generic drug:  carboxymethylcellulose sodium    Your last dose was: Your next dose is:          Administer 2 Drops to both eyes three (3) times daily. 2 Drop                 REMERON 15 mg tablet  Generic drug:  mirtazapine    Your last dose was: Your next dose is: Take 15 mg by mouth nightly. 15 mg                 simvastatin 20 mg tablet  Commonly known as:  ZOCOR    Your last dose was: Your next dose is: Take 20 mg by mouth nightly. 20 mg                 therapeutic multivitamin tablet  Commonly known as:  Reyna Cerna 54 last dose was: Your next dose is: Take 1 Tab by mouth daily. 1 Tab                 VITAMIN D3 (1000 Units /25 mcg) tablet  Generic drug:  cholecalciferol    Your last dose was: Your next dose is: Take 1,000 Units by mouth daily. 1,000 Units                 ZOLOFT 50 mg tablet  Generic drug:  sertraline    Your last dose was: Your next dose is: Take 50 mg by mouth daily. Indications: major depressive disorder   50 mg                        STOP taking these medications      losartan 25 mg tablet  Commonly known as:  COZAAR                  Where to Get Your Medications        Information on where to get these meds will be given to you by the nurse or doctor.     Ask your nurse or doctor about these medications  amLODIPine 5 mg tablet  doxycycline 100 mg tablet       Pavel Mendez,   Contact: [phone]

## 2019-11-02 NOTE — PROGRESS NOTES
Communication to Patient/Family:  Met with patient and family and they are agreeable to the transition plan. SNF/Rehab Transition:  Patient has been accepted to [facility]  and meets criteria for admission. Patient will transported by Wheel chair br[facility] [employer]) and expected to leave at 4:30pm   Communication to SNF/Rehab:  Bedside RN, , has been notified to update the transition plan to the facility and call report [phone] ask for nurse [facility]   Discharge information has been updated on the AVS. And communicated to facility via [employer], or CC link. Discharge instructions to be fax'd to facility at [facility] #). [phone]    Nursing Please include all hard scripts for controlled substances, med rec and dc summary, and AVS in packet. Reviewed and confirmed with facility, [facility], can manage the patient care needs for the following:     Tia Andersen with (X) only those applicable:  Medication:  [x]Medications are available at the facility  []IV Antibiotics    []Controlled Substance  hard copies available sent. []Weekly Labs    Equipment:  []CPAP/BiPAP  []Wound Vacuum  []Heredia or Urinary Device  []PICC/Central Line  []Nebulizer  []Ventilator    Treatment:  []Isolation (for MRSA, VRE, etc.)  []Surgical Drain Management  []Tracheostomy Care  []Dressing Changes  []Dialysis with transportation  []PEG Care  []Oxygen  []Daily Weights for Heart Failure    Dietary:  []Any diet limitations  []Tube Feedings   []Total Parenteral Management (TPN)    Financial Resources:  []Medicaid Application Completed    []UAI Completed and copy given to pt/family  and copy given to pt/family  []A screening has previously been completed. []Level II Completed    [] Private pay individual who will not become   financially eligible for Medicaid within 6 months from admission to a [address] [place] [address] facility. [] Individual refused to have screening conducted.      []Medicaid Application Completed    []The screening denied because it was determined individual did not need/did not qualify for nursing facility level of care. [] Out of state residents seeking direct admission to a [address] facility. [] Individuals who are inpatients of an out of state hospital, or in state or out of state veterans/ hospital and seek direct admission to a [address] facility  [] Individuals who are pateints or residents of a state owned/operated facility that is licensed by Department of [employer] ([facility]) and seek direct admission to [address] [place] [address]  [] A screening not required for enrollment in [address] [address] [address] services as set out in [address] [place] [address] 17-  [] [facility] staff shall perform screenings of the [facility] clients. Advanced Care Plan:  [x]Surrogate Decision Maker of Care  []POA  []Communicated Code Status and copy sent.     Other:         ANIL Shah, [address] [place] [address]

## 2019-11-02 NOTE — ROUTINE PROCESS
Bedside and Verbal shift change report given to Renan Gardner  (oncoming nurse) by Bebeto Mirza (offgoing nurse). Report included the following information SBAR, Kardex, MAR, Accordion and Recent Results.

## 2019-11-04 LAB
BACTERIA SPEC CULT: NORMAL
BACTERIA SPEC CULT: NORMAL
SERVICE CMNT-IMP: NORMAL
SERVICE CMNT-IMP: NORMAL